# Patient Record
Sex: FEMALE | Race: BLACK OR AFRICAN AMERICAN | Employment: FULL TIME | URBAN - METROPOLITAN AREA
[De-identification: names, ages, dates, MRNs, and addresses within clinical notes are randomized per-mention and may not be internally consistent; named-entity substitution may affect disease eponyms.]

---

## 2018-03-13 ENCOUNTER — HOSPITAL ENCOUNTER (OUTPATIENT)
Age: 48
Setting detail: SPECIMEN
Discharge: HOME | End: 2018-03-13
Payer: COMMERCIAL

## 2018-03-13 ENCOUNTER — OFFICE VISIT (OUTPATIENT)
Dept: OBGYN CLINIC | Age: 48
End: 2018-03-13
Payer: COMMERCIAL

## 2018-03-13 VITALS
SYSTOLIC BLOOD PRESSURE: 122 MMHG | WEIGHT: 281 LBS | HEIGHT: 66 IN | BODY MASS INDEX: 45.16 KG/M2 | DIASTOLIC BLOOD PRESSURE: 82 MMHG

## 2018-03-13 DIAGNOSIS — D25.9 UTERINE LEIOMYOMA, UNSPECIFIED LOCATION: ICD-10-CM

## 2018-03-13 DIAGNOSIS — Z01.419 ENCOUNTER FOR GYNECOLOGICAL EXAMINATION WITHOUT ABNORMAL FINDING: Primary | ICD-10-CM

## 2018-03-13 DIAGNOSIS — N89.8 VAGINAL DISCHARGE: ICD-10-CM

## 2018-03-13 DIAGNOSIS — N89.8 VAGINAL ODOR: ICD-10-CM

## 2018-03-13 DIAGNOSIS — Z12.31 VISIT FOR SCREENING MAMMOGRAM: ICD-10-CM

## 2018-03-13 PROCEDURE — 99386 PREV VISIT NEW AGE 40-64: CPT | Performed by: OBSTETRICS & GYNECOLOGY

## 2018-03-13 RX ORDER — HYDROCODONE BITARTRATE AND ACETAMINOPHEN 5; 325 MG/1; MG/1
TABLET ORAL
Refills: 0 | COMMUNITY
Start: 2017-12-27 | End: 2021-08-20

## 2018-03-13 RX ORDER — CYCLOBENZAPRINE HCL 10 MG
TABLET ORAL
Refills: 0 | COMMUNITY
Start: 2017-12-27 | End: 2021-08-20

## 2018-03-13 NOTE — PROGRESS NOTES
DATE OF VISIT:  3/13/18        History and Physical    Yessenia Regan    :  1970  CHIEF COMPLAINT:    Chief Complaint   Patient presents with   Darlin Liu Doctor     New patient Here for annual  Last pap 12  last mammogram  nl  wants std testing has odor                     HPI :   Yessenia Regan is a 41 y.o. femaleGRAVIDA 5 PARA     Yessenia Regan returns today for her annual exam.  She is having regular bowel movements without constipation or diarrhea. She denies any involuntary loss of urine. She is otherwise without any significant complaints today.  _____________________________________________________________________  Past Medical History:   Diagnosis Date    Anemia     with first pregnancy    Diabetes mellitus ([address]     NIDDM    Hypertension                                                                    Past Surgical History:   Procedure Laterality Date    DENTAL SURGERY      dentures    DILATION AND CURETTAGE       Family History   Problem Relation Age of Onset    Hypertension Paternal Grandmother     Diabetes Paternal Grandmother     Coronary Art Dis Paternal Grandmother     Alzheimer's Disease Paternal Grandmother     Cancer Maternal Grandmother      LUNG    Cancer Maternal Grandfather     Hypertension Father     Diabetes Father     Cancer Father 68     pancreatic    Diabetes Sister     Hypertension Paternal Aunt     Coronary Art Dis Paternal Aunt      History   Smoking Status    Former Smoker   Smokeless Tobacco    Never Used     History   Alcohol Use No     Comment: SOCIALLY     Current Outpatient Prescriptions   Medication Sig Dispense Refill    cyclobenzaprine (FLEXERIL) 10 MG tablet take 1 tablet by mouth three times a day if needed  0    HYDROcodone-acetaminophen (NORCO) 5-325 MG per tablet take 1 tablet by mouth every 6 hours if needed  0    metoprolol (TOPROL XL) 50 MG XL tablet Take 50 mg by mouth 2 times daily.       MetFORMIN HCl neck was supple. There is no tracheal deviation, thyromegaly or supraclavicular adenopathy appreciated. Breast:   The patients breasts were symmetrical.  Breasts are nontender and there  were no masses, discharge or pathologic skin changes. There is no supraclavicular or axillary adenopathy bilaterally. Respiratory: There was unlabored respiratory effort. Lungs clear to ascultation without wheezes, rales or rhonchi in all fields bilaterally. Cardiovascular:  Normal sinus rhythm with a regular rate and without murmur, rubs or gallops. Abdomen: The abdomen was soft and non-tender with no guarding, rebound, CVAT or rigidity. No hernias were appreciated. Bowel sounds were normally active. Pelvic exam:  No vulvar, vaginal or cervical lesions are noted. There is grayish discharge noted. Slightly increased grayish vaginal discharge present, no significant cystocele, rectocele or enterocele noted. Uterus slightly enlarged and irregular in contour but limited due to body habitus. Uterus is without CMT and adnexa nontender and without abnormal masses bilaterally. Extremities:  FROM and nontender without clubbing cyanosis or edema. ASSESSMENT:         ICD-10-CM ICD-9-CM    1. Encounter for gynecological examination without abnormal finding Z01.419 V72.31 PAP SMEAR   2. Uterine leiomyoma, unspecified location D25.9 218.9    3. Vaginal odor N89.8 625.8 C. Trachomatis / N. Gonorrhoeae, DNA      VAGINITIS DNA PROBE   4. Vaginal discharge N89.8 623.5 C. Trachomatis / N. Gonorrhoeae, DNA      VAGINITIS DNA PROBE   5. Visit for screening mammogram Z12.31 V76.12 ZHANG DIGITAL SCREEN W CAD BILATERAL                   PLAN:  Cultures were obtained and she will be contacted with results and recommendation. Return to the office in 1 year or when necessary  Patient was seen with total face to face time of 20 minutes. Lynette Tavarez M.D., Mph  [facility]  [place]

## 2018-03-14 LAB
C TRACH DNA GENITAL QL NAA+PROBE: NEGATIVE
DIRECT EXAM: ABNORMAL
Lab: ABNORMAL
N. GONORRHOEAE DNA: NEGATIVE
SPECIMEN DESCRIPTION: ABNORMAL
STATUS: ABNORMAL

## 2018-03-15 RX ORDER — METRONIDAZOLE 500 MG/1
500 TABLET ORAL 2 TIMES DAILY
Qty: 14 TABLET | Refills: 0 | Status: SHIPPED | OUTPATIENT
Start: 2018-03-15 | End: 2018-03-22

## 2018-03-22 LAB — CYTOLOGY REPORT: NORMAL

## 2018-03-26 RX ORDER — METRONIDAZOLE 500 MG/1
500 TABLET ORAL ONCE
Qty: 4 TABLET | Refills: 0 | Status: SHIPPED | OUTPATIENT
Start: 2018-03-26 | End: 2018-03-26

## 2018-07-26 ENCOUNTER — HOSPITAL ENCOUNTER (OUTPATIENT)
Age: 48
Setting detail: SPECIMEN
Discharge: HOME | End: 2018-07-26
Payer: COMMERCIAL

## 2018-07-26 ENCOUNTER — OFFICE VISIT (OUTPATIENT)
Dept: OBGYN CLINIC | Age: 48
End: 2018-07-26
Payer: COMMERCIAL

## 2018-07-26 VITALS
DIASTOLIC BLOOD PRESSURE: 72 MMHG | WEIGHT: 293 LBS | HEIGHT: 66 IN | SYSTOLIC BLOOD PRESSURE: 118 MMHG | BODY MASS INDEX: 47.09 KG/M2

## 2018-07-26 DIAGNOSIS — N89.8 VAGINAL DISCHARGE: Primary | ICD-10-CM

## 2018-07-26 DIAGNOSIS — N89.8 VAGINAL DISCHARGE: ICD-10-CM

## 2018-07-26 PROCEDURE — 99213 OFFICE O/P EST LOW 20 MIN: CPT | Performed by: OBSTETRICS & GYNECOLOGY

## 2018-07-26 RX ORDER — METRONIDAZOLE 500 MG/1
500 TABLET ORAL 2 TIMES DAILY
Qty: 14 TABLET | Refills: 0 | Status: SHIPPED | OUTPATIENT
Start: 2018-07-26 | End: 2018-08-02

## 2018-07-26 NOTE — PROGRESS NOTES
Elian Rivas presents today with a history of abnormal vaginal discharge. The discharge has been present for 2 weeks and is associated with foul  odor,but no irritation or itching. She denies any fever, chills, nausea/vomiting, significant abdominal/pelvic discomfort, abnormal bleeding or UTI symptoms. Physical exam:    Vitals:    07/26/18 0909   BP: 118/72   Site: Right Arm   Position: Sitting   Weight: 294 lb (133.4 kg)   Height: 5' 6\" (1.676 m)       The abdomen is soft and nontender to deep palpation without organomegaly, masses or CVAT. Bowel sounds are normally active. External genitalia without lesions or inflammation noted. The vagina withMalodorous grayish white appearing discharge and no vaginal or cervical lesions or inflammation noted. Uterus nongravid and without CMT. Adnexa nontender and without abnormal masses bilaterally. Assessment/Plan:    ICD-10-CM ICD-9-CM    1. Vaginal discharge N89.8 623.5 VAGINITIS DNA PROBE      C. Trachomatis / N. Gonorrhoeae, DNA     Uriprobe and aerobic cultures were done. Urine culture was not sent. She will be contacted with results and recommendations. She was treated for presumptive BV with Flagyl . She will return to the office for her next scheduled appointment or prn. Patient was seen with total face to face time of 15 minutes.

## 2018-10-25 ENCOUNTER — HOSPITAL ENCOUNTER (OUTPATIENT)
Age: 48
Setting detail: SPECIMEN
Discharge: HOME | End: 2018-10-25
Payer: COMMERCIAL

## 2018-10-25 ENCOUNTER — OFFICE VISIT (OUTPATIENT)
Dept: OBGYN CLINIC | Age: 48
End: 2018-10-25
Payer: COMMERCIAL

## 2018-10-25 VITALS
DIASTOLIC BLOOD PRESSURE: 82 MMHG | WEIGHT: 293 LBS | SYSTOLIC BLOOD PRESSURE: 132 MMHG | BODY MASS INDEX: 47.09 KG/M2 | HEIGHT: 66 IN

## 2018-10-25 DIAGNOSIS — N89.8 VAGINAL DISCHARGE: ICD-10-CM

## 2018-10-25 DIAGNOSIS — N89.8 VAGINAL DISCHARGE: Primary | ICD-10-CM

## 2018-10-25 LAB
DIRECT EXAM: ABNORMAL
Lab: ABNORMAL
SPECIMEN DESCRIPTION: ABNORMAL
STATUS: ABNORMAL

## 2018-10-25 PROCEDURE — 99213 OFFICE O/P EST LOW 20 MIN: CPT | Performed by: OBSTETRICS & GYNECOLOGY

## 2018-10-25 NOTE — PROGRESS NOTES
Yamel Billingsley presents today with a history of abnormal vaginal discharge. The discharge has been present for 2 weeks and is  associated with foul  odor, but without any irritation or itching. She denies any fever, chills, nausea/vomiting, significant abdominal/pelvic discomfort, abnormal bleeding or UTI symptoms. Physical exam:    Vitals:    10/25/18 1050   BP: 132/82   Site: Right Upper Arm   Position: Sitting   Cuff Size: Medium Adult   Weight: 293 lb (132.9 kg)   Height: 5' 6\" (1.676 m)       The abdomen is soft and nontender to deep palpation without organomegaly, masses or CVAT. Bowel sounds are normally active. External genitalia without lesions or inflammation noted. The vagina with Scant amount of normal appearing discharge and no vaginal or cervical lesions or inflammation noted. Uterus nongravid and without CMT. Adnexa nontender and without abnormal masses bilaterally. Assessment/Plan:    ICD-10-CM    1. Vaginal discharge N89.8 C.trachomatis N.gonorrhoeae DNA     VAGINITIS DNA PROBE     Uriprobe and aerobic cultures were done. Urine culture was Not sent. She will be contacted with results and recommendations. She will return to the office for her next scheduled appointment or prn. Patient was seen with total face to face time of 15 minutes.

## 2018-10-26 LAB
C TRACH DNA GENITAL QL NAA+PROBE: NEGATIVE
N. GONORRHOEAE DNA: NEGATIVE

## 2018-10-29 RX ORDER — METRONIDAZOLE 500 MG/1
500 TABLET ORAL 2 TIMES DAILY
Qty: 14 TABLET | Refills: 0 | Status: SHIPPED | OUTPATIENT
Start: 2018-10-29 | End: 2019-02-14 | Stop reason: SDUPTHER

## 2019-02-14 RX ORDER — METRONIDAZOLE 500 MG/1
500 TABLET ORAL 2 TIMES DAILY
Qty: 14 TABLET | Refills: 0 | Status: SHIPPED | OUTPATIENT
Start: 2019-02-14 | End: 2019-02-21

## 2019-07-16 ENCOUNTER — TELEPHONE (OUTPATIENT)
Dept: OBGYN CLINIC | Age: 49
End: 2019-07-16

## 2019-07-16 DIAGNOSIS — N76.0 BV (BACTERIAL VAGINOSIS): Primary | ICD-10-CM

## 2019-07-16 DIAGNOSIS — B96.89 BV (BACTERIAL VAGINOSIS): Primary | ICD-10-CM

## 2019-07-16 RX ORDER — METRONIDAZOLE 500 MG/1
500 TABLET ORAL 2 TIMES DAILY
Qty: 14 TABLET | Refills: 0 | Status: SHIPPED | OUTPATIENT
Start: 2019-07-16 | End: 2019-07-23

## 2019-11-11 ENCOUNTER — TELEPHONE (OUTPATIENT)
Dept: OBGYN CLINIC | Age: 49
End: 2019-11-11

## 2019-11-12 DIAGNOSIS — N76.0 BV (BACTERIAL VAGINOSIS): Primary | ICD-10-CM

## 2019-11-12 DIAGNOSIS — B96.89 BV (BACTERIAL VAGINOSIS): Primary | ICD-10-CM

## 2019-11-12 RX ORDER — METRONIDAZOLE 500 MG/1
500 TABLET ORAL 2 TIMES DAILY
Qty: 14 TABLET | Refills: 0 | Status: SHIPPED | OUTPATIENT
Start: 2019-11-12 | End: 2019-11-19

## 2020-03-24 ENCOUNTER — OFFICE VISIT (OUTPATIENT)
Dept: OBGYN CLINIC | Age: 50
End: 2020-03-24
Payer: COMMERCIAL

## 2020-03-24 ENCOUNTER — HOSPITAL ENCOUNTER (OUTPATIENT)
Age: 50
Setting detail: SPECIMEN
Discharge: HOME | End: 2020-03-24
Payer: COMMERCIAL

## 2020-03-24 VITALS
BODY MASS INDEX: 46.28 KG/M2 | DIASTOLIC BLOOD PRESSURE: 101 MMHG | HEIGHT: 66 IN | WEIGHT: 288 LBS | SYSTOLIC BLOOD PRESSURE: 152 MMHG

## 2020-03-24 LAB
DIRECT EXAM: ABNORMAL
Lab: ABNORMAL
SPECIMEN DESCRIPTION: ABNORMAL

## 2020-03-24 PROCEDURE — 99213 OFFICE O/P EST LOW 20 MIN: CPT | Performed by: OBSTETRICS & GYNECOLOGY

## 2020-03-24 NOTE — PROGRESS NOTES
Marshall Bowens presents today with a possible STD exposure. Her significant other is promiscuous, was informed by a previous contact that she tested positive for CT. He was tested but she is unsure of his results? She denies any abnormal vaginal discharge. , foul  odor, irritation or itching. She denies any fever, chills, nausea/vomiting, significant abdominal/pelvic discomfort, abnormal bleeding or UTI symptoms. Physical exam:    Vitals:    03/24/20 0916   BP: (!) 152/101   Weight: 288 lb (130.6 kg)   Height: 5' 6\" (1.676 m)       The abdomen is soft and nontender to deep palpation without organomegaly, masses or CVAT. Bowel sounds are normally active. External genitalia without lesions or inflammation noted. The vagina with appearing discharge and no vaginal or cervical lesions or inflammation noted. Uterus nongravid and without CMT. Adnexa nontender and without abnormal masses bilaterally. Assessment/Plan:    ICD-10-CM    1. Possible exposure to STD Z20.2 C.trachomatis N.gonorrhoeae DNA     VAGINITIS DNA PROBE     Vaginitis DNA probe was done. Urine culture was not sent. She will be contacted with results and recommendations. She states that she is taking her antihypertensive and was informed of her BP today and to follow-up with her PCP. She will return to the office for her next scheduled appointment or prn. Patient was seen with total face to face time of 15 minutes.

## 2020-03-24 NOTE — PATIENT INSTRUCTIONS
your prescription from the pharmacy and take as directed. If any change in treatment or follow-up is required we will contact you with your results. Return to the office for your next scheduled visit or as needed.

## 2020-03-25 LAB
C TRACH DNA GENITAL QL NAA+PROBE: NEGATIVE
N. GONORRHOEAE DNA: NEGATIVE
SPECIMEN DESCRIPTION: NORMAL

## 2020-03-31 RX ORDER — METRONIDAZOLE 500 MG/1
500 TABLET ORAL 2 TIMES DAILY
Qty: 14 TABLET | Refills: 0 | Status: SHIPPED | OUTPATIENT
Start: 2020-03-31 | End: 2020-04-07

## 2020-10-27 ENCOUNTER — TELEPHONE (OUTPATIENT)
Dept: OBGYN CLINIC | Age: 50
End: 2020-10-27

## 2020-10-27 RX ORDER — METRONIDAZOLE 500 MG/1
500 TABLET ORAL 2 TIMES DAILY
Qty: 14 TABLET | Refills: 0 | Status: SHIPPED | OUTPATIENT
Start: 2020-10-27 | End: 2020-11-03

## 2020-10-27 NOTE — TELEPHONE ENCOUNTER
Patient states symptoms began two days ago and she believes she has a bacterial infection. Patient states she gets them frequently. Patient states she has discharge and odor. Patient denies frequency, burning, itching,and urgency. Patient would like to know if medication  can be called in to her pharmacy. Please advise. Pharmacy in system.

## 2021-06-21 ENCOUNTER — OFFICE VISIT (OUTPATIENT)
Dept: ORTHOPEDIC SURGERY | Age: 51
End: 2021-06-21

## 2021-06-21 VITALS — WEIGHT: 293 LBS | BODY MASS INDEX: 47.09 KG/M2 | HEIGHT: 66 IN

## 2021-06-21 DIAGNOSIS — M25.561 PAIN IN BOTH KNEES, UNSPECIFIED CHRONICITY: Primary | ICD-10-CM

## 2021-06-21 DIAGNOSIS — M25.562 PAIN IN BOTH KNEES, UNSPECIFIED CHRONICITY: Primary | ICD-10-CM

## 2021-06-21 DIAGNOSIS — M17.0 ARTHRITIS OF BOTH KNEES: ICD-10-CM

## 2021-06-21 PROCEDURE — 99244 OFF/OP CNSLTJ NEW/EST MOD 40: CPT | Performed by: ORTHOPAEDIC SURGERY

## 2021-06-21 RX ORDER — MELOXICAM 15 MG/1
15 TABLET ORAL DAILY
Qty: 30 TABLET | Refills: 3 | Status: SHIPPED | OUTPATIENT
Start: 2021-06-21 | End: 2021-10-21

## 2021-06-21 ASSESSMENT — ENCOUNTER SYMPTOMS
SHORTNESS OF BREATH: 0
CHEST TIGHTNESS: 0
COUGH: 0
EYE PAIN: 0
VOMITING: 0
ABDOMINAL PAIN: 0
APNEA: 0

## 2021-06-21 NOTE — PROGRESS NOTES
[facility]  [facility]  [address] [place] [address]  Dept: [phone]    Ambulatory Orthopedic Consult      CHIEF COMPLAINT:    Chief Complaint   Patient presents with    New Patient     L knee pain. No noted injury       HISTORY OF PRESENT ILLNESS:      The patient is a 54 y.o. female who is being seen at the request of  Dr. Basilio Mathew for consultation and evaluation of  Bilateral knee pain. [facility]  presents for bilateral knee pain that has been present for  5 years. The patient does not recall a specific injury. The pain improves with  Resting and activity modification. The pain worsens with  stair climbing and arising from a seated position. Instability is  noted. The patient has had a previous corticosteroid injections and Gel injections without relief. The patient has not had previous physical therapy for this problem. The patient has not tried oral NSAIDs for this problem previously. Patient reports that she takes Metformin for diabetes. She is a Non-smoker. Her current BMI 48.58. Past Medical History:    Past Medical History:   Diagnosis Date    Anemia     with first pregnancy    Diabetes mellitus ([facility] [address] 2007    NIDDM    Hypertension        Past Surgical History:    Past Surgical History:   Procedure Laterality Date    DENTAL SURGERY  2011    dentures    DILATION AND CURETTAGE         Current Medications:   Current Outpatient Medications   Medication Sig Dispense Refill    meloxicam (MOBIC) 15 MG tablet Take 1 tablet by mouth daily 30 tablet 3    cyclobenzaprine (FLEXERIL) 10 MG tablet take 1 tablet by mouth three times a day if needed  0    metoprolol (TOPROL XL) 50 MG XL tablet Take 50 mg by mouth 2 times daily.       MetFORMIN HCl (GLUCOPHAGE PO) Take 500 mg by mouth 2 times daily       HYDROcodone-acetaminophen (NORCO) 5-325 MG per tablet take 1 tablet by mouth every 6 hours if needed (Patient not taking: Reported on 6/21/2021)  0     No current facility-administered medications for this visit. Allergies:    Patient has no known allergies. Social History:   Social History     Socioeconomic History    Marital status: Single     Spouse name: Not on file    Number of children: Not on file    Years of education: Not on file    Highest education level: Not on file   Occupational History    Not on file   Tobacco Use    Smoking status: Former Smoker    Smokeless tobacco: Never Used   Substance and Sexual Activity    Alcohol use: No     Comment: SOCIALLY    Drug use: No    Sexual activity: Yes     Partners: Male   Other Topics Concern    Not on file   Social History Narrative    Not on file     Social Determinants of Health     Financial Resource Strain:     Difficulty of Paying Living Expenses:    Food Insecurity:     Worried About Running Out of Food in the Last Year:     [address] in the Last Year:    Transportation Needs:     Lack of Transportation (Medical):      Lack of Transportation (Non-Medical):    Physical Activity:     Days of Exercise per Week:     Minutes of Exercise per Session:    Stress:     Feeling of Stress :    Social Connections:     Frequency of Communication with Friends and Family:     Frequency of Social Gatherings with Friends and Family:     Attends Je[employer] witness Services:     Active Member of Clubs or Organizations:     Attends Club or Organization Meetings:     Marital Status:    Intimate Partner Violence:     Fear of Current or Ex-Partner:     Emotionally Abused:     Physically Abused:     Sexually Abused:        Family History:  Family History   Problem Relation Age of Onset    Hypertension Paternal Grandmother     Diabetes Paternal Grandmother     Coronary Art Dis Paternal Grandmother     Alzheimer's Disease Paternal Grandmother     Cancer Maternal Grandmother         LUNG    Cancer Maternal Grandfather     Hypertension Father     Diabetes Father     Cancer varus Yunier's testing. No calf tenderness is noted. There is a negative hip log roll and Stinchfield test.  Motor, sensory, vascular examination to the Left lower extremity is intact. Patient has full range of motion of the ankle. Evaluation of the Right knee reveals no significant outward deformity. There is no erythema, warmth, skin lesions, signs of infection. There is tenderness over the medial joint line. There is a mildknee effusion. Range of motion of the Right knee is  5-95. No instability of the knee is appreciated at 0 and 30° of flexion. There is a negative anterior drawer Lachman's test.  There is increased pain with varus Yunier's testing. No calf tenderness is noted. There is a negative hip log roll and Stinchfield test.  Motor, sensory, vascular examination to the Right lower extremity is intact. Patient has full range of motion of the ankle. Radiology:     XR KNEE LEFT (MIN 4 VIEWS)    Result Date: 6/23/2021  History:   Left knee pain Findings:   Standing AP/Lateral/Tunnel/Merchant view xrays of the Left done in the office today shows severe  medial joint space narrowing, tricompartmental osteophytosis, joint line sclerosis medially. No evidence of fracture, subluxation, dislocation, radioopaque foreign body/tumor is noted. Lateral subluxation of the tibia is appreciated. Impression:   Left knee severe  degenerative changes as described above. XR KNEE RIGHT (MIN 4 VIEWS)    Result Date: 6/23/2021  History:   Right knee pain Findings:   Standing AP/Lateral/Tunnel/Merchant view xrays of the Right done in the office today shows severe  medial joint space narrowing, tricompartmental osteophytosis, joint line sclerosis medially. No evidence of fracture, subluxation, dislocation, radioopaque foreign body/tumor is noted. Lateral subluxation of the tibia is appreciated. Impression:   Right knee severe  degenerative changes as described above. ASSESSMENT:     1. Pain in both knees, unspecified chronicity    2. BMI 45.0-49.9, adult ([address]    3. Arthritis of both knees         PLAN:       Reviewed x-rays with the patient today in the office. Discussed etiology and natural history of bilateral knee arthritis. The treatment options may include oral anti-inflammatories, bracing, injections, advanced imaging, activity modification, physical therapy and/or surgical intervention. Had a lengthy discussion with the patient today in the office about a total knee arthroplasty. Had a disscussion with the patient about her BMI and how it correlates with infection. The patient would also need to work on A1c. The patient would like to proceed with mobic, formal therapy, and weightloss center referral.   The patient will follow up in the office as needed. We discussed that the patient should call us with any concerns or questions. Return if symptoms worsen or fail to improve.     Orders Placed This Encounter   Medications    meloxicam (MOBIC) 15 MG tablet     Sig: Take 1 tablet by mouth daily     Dispense:  30 tablet     Refill:  3       Orders Placed This Encounter   Procedures    XR KNEE LEFT (MIN 4 VIEWS)     Standing Status:   Future     Number of Occurrences:   1     Standing Expiration Date:   6/21/2022    XR KNEE RIGHT (MIN 4 VIEWS)     Standing Status:   Future     Number of Occurrences:   1     Standing Expiration Date:   6/21/2022    Ambulatory referral to [facility]     Referral Priority:   Routine     Referral Type:   Consult for Advice and Opinion     Referral Reason:   Specialty Services Required     Requested Specialty:   Bariatrics     Number of Visits Requested:   [facility]     Referral Priority:   Routine     Referral Type:   Eval and Treat     Referral Reason:   Specialty Services Required     Requested Specialty:   Physical Therapy     Number of Visits Requested:   1     Carleen SOLORIO LPN am scribing for and in the presence of Dr. Nancy Guardado  6/24/2021 10:14 PM    I have reviewed and made changes accordingly to the work scribed by Kathryn Grace LPN. The documentation accurately reflects work and decisions made by me. I have also reviewed documentation completed by clinical staff.     Nancy Guardado DO, [facility]  6/24/2021 10:15 PM    This note is created with the assistance of a speech recognition program.  While intending to generate a document that actually reflects the content of the visit, the document can still have some errors including those of syntax and sound a like substitutions which may escape proof reading.  In such instances, actual meaning can be extrapolated by contextual diversion      Electronically signed by Susan Garber on 6/24/2021 at 10:14 PM

## 2021-06-21 NOTE — LETTER
Dr. Dillon Lugo  [address] [address]  Dept: [phone]  Dept Fax: [phone]        6/24/21    Patient: Migule Og  YOB: 1970    Dear Eloise Marie,    I had the pleasure of seeing one of your patients, Chepe Brooks today in the office. Below are the relevant portions of my assessment and plan of care. IMPRESSION:  1. Pain in both knees, unspecified chronicity    2. BMI 45.0-49.9, adult ([facility] [address]    3. Arthritis of both knees      PLAN:  Reviewed x-rays with the patient today in the office. Discussed etiology and natural history of bilateral knee arthritis. The treatment options may include oral anti-inflammatories, bracing, injections, advanced imaging, activity modification, physical therapy and/or surgical intervention. Had a lengthy discussion with the patient today in the office about a total knee arthroplasty. Had a disscussion with the patient about her BMI and how it correlates with infection. The patient would also need to work on A1c. The patient would like to proceed with mobic, formal therapy, and weightloss center referral.   The patient will follow up in the office as needed. We discussed that the patient should call us with any concerns or questions. Thank you for allowing me to participate in the care of this patient. I will keep you updated on this patient's follow up and I look forward to serving you and your patients again in the future. Please don't hesitate to contact me at my mobile number [phone].         Parviz Clifton

## 2021-06-24 ENCOUNTER — HOSPITAL ENCOUNTER (OUTPATIENT)
Dept: PHYSICAL THERAPY | Age: 51
Setting detail: THERAPIES SERIES
Discharge: HOME | End: 2021-06-24
Payer: COMMERCIAL

## 2021-06-24 NOTE — FLOWSHEET NOTE
[x] [facility]) - [facility]  [address]    [phone]  F: [phone]   [] [address]   [address]  P: [phone]  F: [phone]  [] [address]  Therapy  [address]  P: [phone]  F: [phone] [] [address]  P: [phone]  F: [phone]  [] [address]  [facility]   [address]   [place]: [phone]  F: [phone]   [] [address]  [place]: [phone]   F: [phone]     Physical Therapy Cancel/No Show note    Date: 2021  Patient: Domonique Flower  : 1970  MRN: [id number]    Cancels/No Shows to date:     For today's appointment patient:    [x]  Cancelled    [x] Rescheduled appointment     [] No-show     Reason given by patient:    []  Patient ill    []  Conflicting appointment    [] No transportation      [] Conflict with work    [] No reason given    [] Weather related    [] COVID-19    [] Other:      Comments:  Patient went to wrong location for PT appointment.        [] Next appointment was confirmed    Electronically signed by: Alec Resendez PT

## 2021-06-30 ENCOUNTER — HOSPITAL ENCOUNTER (OUTPATIENT)
Dept: PHYSICAL THERAPY | Age: 51
Setting detail: THERAPIES SERIES
Discharge: HOME | End: 2021-06-30
Payer: COMMERCIAL

## 2021-06-30 PROCEDURE — 97110 THERAPEUTIC EXERCISES: CPT

## 2021-06-30 PROCEDURE — 97162 PT EVAL MOD COMPLEX 30 MIN: CPT

## 2021-06-30 NOTE — CONSULTS
[x] [facility] &  Therapy  [address]  [phone]  F: [phone]         Physical Therapy Lower Extremity Evaluation    Date:  2021  Patient: Rocio Shaw  : 1970  MRN: [id number]  Physician: Ana Garcias    Insurance: [employer] (limit 60 Rx)  Medical Diagnosis: Arthritis B knees M17.0     Rehab Codes: M25.561, M25.562, M25.661, M25.662, R26.2, R26.89, M62.551, M62.552  Onset date: 2021  Next 's appt.: August    Subjective:   HPI/CC: Pt reports pain B knee pain beginning last year, at first R knee, then L knee began. Pt reports constant aching, throbbing, pain. Knees hurt so bad she can barely walk, has to stand for a minute before she can walk, bending \"is torture,\" ie getting in car and on toilet, reports she falls into them as can't control. Going up stairs \"is a nightmare,\" is scared knees will buckle on her. Dr Yuni Hauser did most recent steroid injection L knee, approx 1 month ago w/relief. R knee had cartilage injections last year with relief for approx 6 months, insurance denied injections for L knee. Pain decreases w/heat and ice, able to walk better after ice. Pain increases w/walking legs hanging off of couch or bed, sitting for too long, stand-sit, sit-stand, and steps, up to 9/10. No numbness, tingling. Pt reports she should have surgery, but is unable to due to her weight and diabetes, is to see weight management (appt ).        PMHx: [] Unremarkable [x] Diabetes [x] HTN  [] Pacemaker   [] MI/Heart Problems [] Cancer [] Arthritis   [x] Other: anemia, dental surgery, D&C              [x] Refer to full medical chart  In EPIC       Comorbidities:   [x] Obesity [] Dialysis  [] N/A   [] Asthma/COPD [] Dementia [] Other:   [] Stroke [] Sleep apnea [] Other:   [] Vascular disease [] Rheumatic disease [] Other:     Tests: [x] X-Ray: B knees severe degenerative changes [] MRI:  [] Other:      Medications: [x] Refer to full medical record [] None [] Other:  Allergies:      [x] Refer to full medical record [x] None [] Other:    Function:  Hand Dominance  [x] Right  [] Left  Patient lives with: 6 yr old   In what type of home []  One story   [x] Two story [facility]-bed/bath 2nd floor, rail first 5 steps, holds onto rail for rest   Number of stairs to enter 1-no rail   Bathroom has a []  Tub only  [x] Tub/shower combo   [] Walk in shower    []  Grab bars   Washing machine is on []  Main level   [] Second level   [x]2nd unit, 1 small step into   Employer [employer], -working remote   Job Status [x]  Normal duty   [] Light duty   [] Off due to condition    []  Retired   [] Not employed   [] Disability  [] Other:  []  Return to work:    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, 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; 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       ADL/IADL Previous level of function Current level of function Who currently assists the patient with task   Bathing  [x] Independent  [] Assist [x] Independent  [] Assist    Dress/grooming [x] Independent  [] Assist [] Independent  [x] Assist Difficulty w/socks and pants   Transfer/mobility [x] Independent  [] Assist [x] Independent  [] Assist    Feeding [x] Independent  [] Assist [x] Independent  [] Assist    Toileting [x] Independent  [] Assist [x] Independent  [] Assist On/off toilet difficulty\" falls down on\", uses sink to help get up   Driving [x] Independent  [] Assist [x] Independent  [] Assist constant ache when driving, painful in/out of car   Housekeeping [x] Independent  [] Assist [x] Independent  [] Assist    Grocery shop/meal prep [x] Independent  [] Assist [x] Independent  [] Assist      Gait Prior level of function Current level of function    [x] Independent  [] Assist [x] Independent  [] Assist   Device: [x] Independent [x] Independent    [] Straight Cane [] Quad cane [] Straight Cane [] Quad cane    [] Standard walker [] Rolling walker   [] 4 wheeled walker [] Standard walker [] Rolling walker   [] 4 wheeled walker    [] Wheelchair [] Wheelchair     Pain:  [x] Yes  [] No Location: B knees  Pain Rating: (0-10 scale) 7/10 R knee; L knee 3-4/10  Pain altered Tx:  [] Yes  [x] No  Action:    Symptoms:  [] Improving [x] Worsening [] Same    Sleep: [x] OK    [x] Disturbed-difficulty falling asleep, does not wake her up once asleep    Objective:    ROM  ° A/P STRENGTH TESTS (+/-) Left Right Not Tested    Left Right Left Right Ant. Drawer   []   Hip Flex   3+p 3+p Post. Drawer   []   Ext   3+p 3+p Lachmans   []   ER     Valgus Stress   []   IR     Varus Stress   []   ABD     Yuniers   []   ADD     Apleys Comp.   []   Knee Flex 111°p 79°p 3+p 4-p Apleys Dist.   []   Ext 8°p 17°p 4-p 4-p Hip Scouring   []   Ankle DF   4p 3+p KENDRAs   []   PF     Piriformis   []   INV     Kvngs   []   EVER     Talor Tilt   []        Pat-Fem Grind   []   p=pain      OBSERVATION No Deficit Deficit Not Tested Comments   Posture       Forward Head [] [] []    Rounded Shoulders [] [] []    Kyphosis [] [] []    Lordosis [] [] []    Lateral Shift [] [] []    Scoliosis [] [] []    Iliac Crest [] [] []    PSIS [] [] []    ASIS [] [] []    Genu Valgus [] [] []    Genu Varus [] [] []    Genu Recurvatum [] [] []    Pronation [] [] []    Supination [] [] []    Leg Length Discrp [] [] []    Slumped Sitting [] [] []    Palpation [] [x] [] Ant R knee, R med, lat Jt line; L lateral jt line   Sensation [] [] []    Edema [] [] []    Neurological [] [] []    Patellar Mobility [] [x] [] Tender L   Patellar Orientation [] [x] [] R glides lat, L glides medial w/quad contraction   Gait [] [x] [] Analysis:           Functional Test: LEFS Score: 81% functionally impaired     Comments:    Assessment:  Patient would benefit from skilled physical therapy services in order to: decrease knee pain, increase knee ROM, increase LE strength, and improve walking, steps, sit-stands, in/out of car, and on/off toilet. Problems:    [x] ? Pain: R knee 7/10, L knee 3-4/10, B knees up to 9/10  [x] ? ROM: B knees   [x] ? Strength: B LE  [x] ? Function: LEFS 81% loss of function   [] Other:       STG: (to be met in 10 treatments)  1. ? Pain: R knee average 6/10, 8/10 at worst; L knee average 4/10, 7/10 at worst  2. ? ROM: R knee 10-96°, L knee 5-116°  3. ? Strength: B knees 4/5, B hips 4-/5, R DF 4-/5  4. ? Function: LEFS 66% loss of function   5. Pt demonstrate improved gait pattern  6. Patient to be independent with home exercise program as demonstrated by performance with correct form without cues. LTG: (to be met in 20 treatments)  1. ? Pain: R knee average 4/10, 6/10 at worst; L knee average 2/10, 5/10 at worst  2. ? ROM: R knee 6-106°  3. ? Strength: B knees 4+/5, B hips 4/5, R DF 4/5  4. ? Function: LEFS 40% loss of function   5. Pt reports improved sit-stands, in/out of car, and steps                   Patient goals: \"To be able to move freely with little or no pain, bend my knees and relief of pain. \"    Rehab Potential:  [x] Good  [] Fair  [] Poor   Suggested Professional Referral:  [x] No  [] Yes:  Barriers to Goal Achievement:  [x] No  [] Yes:  Domestic Concerns:  [x] No  [] Yes:    Pt. Education:  [x] Plans/Goals, Risks/Benefits discussed  [x] Home exercise program    Method of Education: [x] Verbal  [x] Demo  [x] Written  Comprehension of Education:  [x] Verbalizes understanding. [] Demonstrates understanding. [] Needs Review. [] Demonstrates/verbalizes understanding of HEP/Ed previously given. HEP: Access Code: [email or web address]  URL: [email or web address]  Supine Quad Set - 3 x daily - 7 x weekly - 10 reps - 5 seconds hold  Supine Hip Adduction Isometric with Ball - 3 x daily - 7 x weekly - 10 reps - 5 sec hold  Supine Short Arc Quad - 3 x daily - 7 x weekly - 20 reps  Supine Active Straight Leg Raise - 3 x daily - 7 x weekly - 20 reps        Treatment Plan:  [x] Therapeutic Exercise   51994  [] Iontophoresis: 4 mg/mL Dexamethasone Sodium Phosphate  mAmin  12622   [] Therapeutic Activity  10164 [x] Vasopneumatic cold with compression  71569    [x] Gait Training   27071 [x] Ultrasound   09237   [] Neuromuscular Re-education  77760 [] Electrical Stimulation Unattended  75197   [x] Manual Therapy  31413 [] Electrical Stimulation Attended  46224   [x] Instruction in HEP  [] Lumbar/Cervical Traction  16021   [] Aquatic Therapy   93383 [x] Cold/hotpack    [] Massage   98474      [] Dry Needling, 1 or 2 muscles  41165   [] Biofeedback, first 15 minutes   67694  [] Biofeedback, additional 15 minutes   30942 [] Dry Needling, 3 or more muscles  47931     []  Medication allergies reviewed for use of    Dexamethasone Sodium Phosphate 4mg/ml     with iontophoresis treatments. Pt is not allergic.     Frequency:  2 x/week for 20 visits        Todays Treatment:  Modalities:   Precautions:  Exercises:  Exercise Reps/ Time Weight/ Level Comments         Quad sets  10x  5sec     Add sets  10x  5sec     SAQ 10x     SLR 10x           Other:    Specific Instructions for next treatment:begin SciFit, remaining mat ex, progress knee ex per Pt tolerance; begin game ready to decrease pain, inflammation      Evaluation Complexity:  History (Personal factors, comorbidities) [] 0 [] 1-2 [x] 3+   Exam (limitations, restrictions) [] 1-2 [] 3 [x] 4+   Clinical presentation (progression) [] Stable [x] Evolving  [] Unstable   Decision Making [] Low [x] Moderate [] High    [] Low Complexity [x] Moderate Complexity [] High Complexity       Treatment Charges: Mins Units   [x] Evaluation       []  Low       [x]  Moderate       []  High 39 1   []  Modalities     [x]  Ther Exercise 12 1   []  Manual Therapy     []  Ther Activities     []  Aquatics     []  Vasocompression     []  Other       TOTAL TREATMENT TIME: 51 min    Time in:0703   Time OTJ:1354    Electronically signed by: Azalea Bradford PT          Physician Signature:________________________________Date:__________________  By signing above or cosigning this note, I have reviewed this plan of care and certify a need for medically necessary rehabilitation services.      *PLEASE SIGN ABOVE AND FAX BACK ALL PAGES*

## 2021-07-06 ENCOUNTER — HOSPITAL ENCOUNTER (OUTPATIENT)
Dept: PHYSICAL THERAPY | Age: 51
Setting detail: THERAPIES SERIES
Discharge: HOME | End: 2021-07-06
Payer: COMMERCIAL

## 2021-07-06 PROCEDURE — 97016 VASOPNEUMATIC DEVICE THERAPY: CPT

## 2021-07-06 PROCEDURE — 97110 THERAPEUTIC EXERCISES: CPT

## 2021-07-06 NOTE — FLOWSHEET NOTE
[x] [facility] &  Therapy  [address]  [phone]  F: [phone]     Physical Therapy Daily Treatment Note    Date:  2021  Patient Name:  Denis Chávez      :  1970    MRN: [id number]  Physician: Darya Alston                   Insurance: [facility] [employer] (limit 60 Rx)  Medical Diagnosis: Arthritis B knees M17.0                                    Rehab Codes: M25.561, M25.562, M25.661, M25.662, R26.2, R26.89, M62.551, M62.552  Onset date: 2021                      Next 's appt.: August    Visit# / total visits:    Cancels/No Shows: 0/1    Subjective:    Pain:  [] Yes  [] No Location: B knees   Pain Rating: (0-10 scale) 6/10 R, 4/10 L   Pain altered Tx:  [] No  [] Yes  Action:  Comments: Knees are hurting a little this morning. Worsens with prolonged standing/walking. Objective:  Modalities: Game Ready to R knee at end of Tx, 38 degrees min compression   Precautions:  Exercises:  Exercise Reps/ Time Weight/ Level Comments   SciFit 6 min Level 4          Seated      Hamstring Stretch on Stool 3x20\" ea     LAQ 10x3\"           Supine      Quadriceps Isometric 10x5\" ea     Hip Adduction Isometric 15x5\"     Heel Slides 10x ea     SLR 10x ea     Bridge w/ball 15x     SAQ w/ball 10-15x     Hooklying Clamshell 2x10 Blueberry                                                                Other:      Treatment Charges: Mins Units   []  Modalities     [x]  Ther Exercise 30 2   []  Manual Therapy     []  Ther Activities     []  Aquatics     []  Vasocompression 15 1   []  Other     Total Treatment time 45 3       Assessment: [x] Progressing toward goals: initiated Tx this date to address B knee ROM, strength and overall improved function. R knee more painful throughout Tx, but notes it did start feeling a little better throughout Tx. Relief noted following vaso to R knee this date. [] No change.      [] Other:  [x] Patient would benefit from skilled signed by:  Jass Agarwal, OLEG

## 2021-07-09 ENCOUNTER — HOSPITAL ENCOUNTER (OUTPATIENT)
Dept: PHYSICAL THERAPY | Age: 51
Setting detail: THERAPIES SERIES
Discharge: HOME | End: 2021-07-09
Payer: COMMERCIAL

## 2021-07-09 PROCEDURE — 97016 VASOPNEUMATIC DEVICE THERAPY: CPT

## 2021-07-09 PROCEDURE — 97110 THERAPEUTIC EXERCISES: CPT

## 2021-07-09 NOTE — FLOWSHEET NOTE
[x] [facility]  Therapy  [address]  [phone]  F: [phone]     Physical Therapy Daily Treatment Note    Date:  2021  Patient Name:  Segundo Odom      :  1970    MRN: [id number]  Physician: Chucho Zhong                   Insurance: [employer] (limit 60 Rx)  Medical Diagnosis: Arthritis B knees M17.0                                    Rehab Codes: M25.561, M25.562, M25.661, M25.662, R26.2, R26.89, M62.551, M62.552  Onset date: 2021                      Next 's appt.: August    Visit# / total visits: 3/20   Cancels/No Shows: 0/1    Subjective:    Pain:  [x] Yes  [] No Location: B knees   Pain Rating: (0-10 scale) 7/10 R, 4/10 L   Pain altered Tx:  [] No  [] Yes  Action:  Comments: Pt reports doing the exercises helps, but walking anywhere really aggravates it. Game ready R knee last Rx felt good.       Objective:  Modalities: Game Ready to R knee at end of Tx, 38 degrees min compression   Precautions:  Exercises:  Exercise Reps/ Time Weight/ Level Comments   SciFit 5 min Level 2 Pt reports R knee bending is painful, instructed to keep comfortable ROM         Stand at bars    Added    Calf stretch  3x 15sec On slant board   HR  15x     HS curls 10x     Hip abd  10x                 Seated      Hamstring Stretch on Stool 3x20\" ea     LAQ 10x3\"           Supine      Quadriceps Isometric 10x5\" ea     Hip Adduction Isometric 15x5\"     Heel Slides 15x ea  Progressed reps    SLR 10x ea     Bridge w/ball 15x     SAQ w/ball 15x     Hooklying Clamshell 2x10 Blueberry                                                                Other:      Treatment Charges: Mins Units   []  Modalities     [x]  Ther Exercise 32 2   []  Manual Therapy     []  Ther Activities     []  Aquatics     [x]  Vasocompression 15 1   []  Other     Total Treatment time 47 3       Assessment: [x] Progressing toward goals: Initiated standing calf stretch, HR, HS curls and hip abd to improve standing activities. Cont game ready R knee at end of Rx to decrease pain and edema. [] No change. [] Other:  [x] Patient would benefit from skilled physical therapy services in order to: decrease knee pain, increase knee ROM, increase LE strength, and improve walking, steps, sit-stands, in/out of car, and on/off toilet. STG: (to be met in 10 treatments)  1. ? Pain: R knee average 6/10, 8/10 at worst; L knee average 4/10, 7/10 at worst  2. ? ROM: R knee 10-96°, L knee 5-116°  3. ? Strength: B knees 4/5, B hips 4-/5, R DF 4-/5  4. ? Function: LEFS 66% loss of function   5. Pt demonstrate improved gait pattern  6. Patient to be independent with home exercise program as demonstrated by performance with correct form without cues.     LTG: (to be met in 20 treatments)  1. ? Pain: R knee average 4/10, 6/10 at worst; L knee average 2/10, 5/10 at worst  2. ? ROM: R knee 6-106°  3. ? Strength: B knees 4+/5, B hips 4/5, R DF 4/5  4. ? Function: LEFS 40% loss of function   5. Pt reports improved sit-stands, in/out of car, and steps                    Patient goals: \"To be able to move freely with little or no pain, bend my knees and relief of pain. \"    Pt. Education:  [x] Yes  [] No  [x] Reviewed Prior HEP/Ed  Method of Education: [x] Verbal  [x] Demo  [] Written  Comprehension of Education:  [x] Verbalizes understanding. [] Demonstrates understanding. [x] Needs review. [] Demonstrates/verbalizes HEP/Ed previously given. 6/30/21: HEP: Access Code: [email or web address]  URL: [email or web address]  Supine Quad Set - 3 x daily - 7 x weekly - 10 reps - 5 seconds hold  Supine Hip Adduction Isometric with Ball - 3 x daily - 7 x weekly - 10 reps - 5 sec hold  Supine Short Arc Quad - 3 x daily - 7 x weekly - 20 reps  Supine Active Straight Leg Raise - 3 x daily - 7 x weekly - 20 reps      Plan: [x] Continue current frequency toward long and short term goals.    [x] Frequency:  2 x/week for 20 visits [x] Specific Instructions for subsequent treatments: add remaining standing ex soon, progress mat ex, progress knee ex per Pt tolerance          Time In: 0805            Time Out: 6381      Electronically signed by:  Ángela Jaime PT

## 2021-07-14 ENCOUNTER — HOSPITAL ENCOUNTER (OUTPATIENT)
Dept: PHYSICAL THERAPY | Age: 51
Setting detail: THERAPIES SERIES
Discharge: HOME | End: 2021-07-14
Payer: COMMERCIAL

## 2021-07-14 ENCOUNTER — TELEPHONE (OUTPATIENT)
Dept: BARIATRICS/WEIGHT MGMT | Age: 51
End: 2021-07-14

## 2021-07-14 PROCEDURE — 97016 VASOPNEUMATIC DEVICE THERAPY: CPT

## 2021-07-14 PROCEDURE — 97110 THERAPEUTIC EXERCISES: CPT

## 2021-07-14 NOTE — FLOWSHEET NOTE
4       Assessment: [x] Progressing toward goals: Progressed gluteal and quad exercises for strengthening as charted, did increase focus on medial quads. Added hip flexor stretching with good tolerance. Will progress ckc exercises due to good tolerance throughout session today. [] No change. [] Other:  [x] Patient would benefit from skilled physical therapy services in order to: decrease knee pain, increase knee ROM, increase LE strength, and improve walking, steps, sit-stands, in/out of car, and on/off toilet. STG: (to be met in 10 treatments)  1. ? Pain: R knee average 6/10, 8/10 at worst; L knee average 4/10, 7/10 at worst  2. ? ROM: R knee 10-96°, L knee 5-116°  3. ? Strength: B knees 4/5, B hips 4-/5, R DF 4-/5  4. ? Function: LEFS 66% loss of function   5. Pt demonstrate improved gait pattern  6. Patient to be independent with home exercise program as demonstrated by performance with correct form without cues.     LTG: (to be met in 20 treatments)  1. ? Pain: R knee average 4/10, 6/10 at worst; L knee average 2/10, 5/10 at worst  2. ? ROM: R knee 6-106°  3. ? Strength: B knees 4+/5, B hips 4/5, R DF 4/5  4. ? Function: LEFS 40% loss of function   5. Pt reports improved sit-stands, in/out of car, and steps                    Patient goals: \"To be able to move freely with little or no pain, bend my knees and relief of pain. \"    Pt. Education:  [x] Yes  [] No  [x] Reviewed Prior HEP/Ed  Method of Education: [x] Verbal  [x] Demo  [] Written  Comprehension of Education:  [x] Verbalizes understanding. [] Demonstrates understanding. [x] Needs review. [] Demonstrates/verbalizes HEP/Ed previously given. 6/30/21: HEP: Access Code: [email or web address]  URL: [email or web address]  Supine Quad Set - 3 x daily - 7 x weekly - 10 reps - 5 seconds hold  Supine Hip Adduction Isometric with Ball - 3 x daily - 7 x weekly - 10 reps - 5 sec hold  Supine Short Arc Quad - 3 x daily - 7 x weekly - 20 reps  Supine

## 2021-07-16 ENCOUNTER — HOSPITAL ENCOUNTER (OUTPATIENT)
Dept: PHYSICAL THERAPY | Age: 51
Setting detail: THERAPIES SERIES
Discharge: HOME | End: 2021-07-16
Payer: COMMERCIAL

## 2021-07-16 PROCEDURE — 97016 VASOPNEUMATIC DEVICE THERAPY: CPT

## 2021-07-16 PROCEDURE — 97110 THERAPEUTIC EXERCISES: CPT

## 2021-07-16 NOTE — FLOWSHEET NOTE
[x] [facility]  [address]  [phone]  F: [phone]     Physical Therapy Daily Treatment Note    Date:  2021  Patient Name:  Michelet Korean      :  1970    MRN: [id number]  Physician: Felipe Oleary                   Insurance: [employer] (limit 60 Rx)  Medical Diagnosis: Arthritis B knees M17.0                                    Rehab Codes: M25.561, M25.562, M25.661, M25.662, R26.2, R26.89, M62.551, M62.552  Onset date: 2021                      Next 's appt.: August  Visit# / total visits:    Cancels/No Shows: 0/1    Subjective:    Pain:  [x] Yes  [] No Location: B knees   Pain Rating: (0-10 scale) 4/10 R, 4/10 L   Pain altered Tx:  [] No  [] Yes  Action:  Comments:   Reports knee are slowly feeling better, R knee feels stiffer than left. Objective:  Modalities: Game Ready to R knee at end of Tx, 38° mod compression and CP on left knee with wedge post exercises. Precautions:  Exercises:  Exercise   Jason knees Reps/ Time Weight/ Level Comments   SciFit 5 min Level 2           Stand at bars        Calf stretch  3x 15sec On slant board   HR  20x 1.5# Added wt,incr. Reps    HS curls 15x 1.5 # '   Hip abd  15x 1.5# '   Hip ext 15x 1.5# \"   Hip flexion/SLR 15x 1.5# \"   TKE 15x5\" blue     Step up  10x 6\" Lateral,fwd, added , trial 4\" step ups next session. Seated      HS stretch on Stool 3x20\" ea  Omitted in error    LAQ 10x3\" 1.5# W/ ball, added wt            Supine      Quadriceps Isometric 10x5\" ea     Hip Adduction Isometric 15x5\"      Heel Slides 15x ea      SLR 10x ea A    SLR w/ ER 10x ea A     Bridge w/ball 15x     SAQ w/ball 15x 1.5 # -R  A-L   Manual assist for Left patellar tracking.     Hooklying Clamshell 2x10 Blueberry           Prone      Hip flexor stretch 3x20\"ea  Strap,                                               Other:      Treatment Charges: Mins Units   []  Modalities [x]  Ther Exercise 49  3   []  Manual Therapy     []  Ther Activities     []  Aquatics     [x]  Vasocompression 15 1   []  Other     Total Treatment time  64        Assessment: [x] Progressing toward goals: Progressed CKC exercises and added resistance for gluteal and quad strengthening. [] No change. [x] Other:  Will change step up to  a decreased height due to use UE greatly. Offered manual assistance for patellar traction on Left with improved comfort expressed  [x] Patient would benefit from skilled physical therapy services in order to: decrease knee pain, increase knee ROM, increase LE strength, and improve walking, steps, sit-stands, in/out of car, and on/off toilet. STG: (to be met in 10 treatments)  1. ? Pain: R knee average 6/10, 8/10 at worst; L knee average 4/10, 7/10 at worst  2. ? ROM: R knee 10-96°, L knee 5-116°  3. ? Strength: B knees 4/5, B hips 4-/5, R DF 4-/5  4. ? Function: LEFS 66% loss of function   5. Pt demonstrate improved gait pattern  6. Patient to be independent with home exercise program as demonstrated by performance with correct form without cues.     LTG: (to be met in 20 treatments)  1. ? Pain: R knee average 4/10, 6/10 at worst; L knee average 2/10, 5/10 at worst  2. ? ROM: R knee 6-106°  3. ? Strength: B knees 4+/5, B hips 4/5, R DF 4/5  4. ? Function: LEFS 40% loss of function   5. Pt reports improved sit-stands, in/out of car, and steps                    Patient goals: \"To be able to move freely with little or no pain, bend my knees and relief of pain. \"    Pt. Education:  [x] Yes  [] No  [x] Reviewed Prior HEP/Ed  Method of Education: [x] Verbal  [x] Demo  [] Written  Comprehension of Education:  [x] Verbalizes understanding. [] Demonstrates understanding. [x] Needs review. [] Demonstrates/verbalizes HEP/Ed previously given. 6/30/21: HEP: Access Code: [email or web address]  URL: [email or web address]  Supine Quad Set - 3 x daily - 7 x weekly - 10 reps - 5 seconds hold  Supine Hip Adduction Isometric with Ball - 3 x daily - 7 x weekly - 10 reps - 5 sec hold  Supine Short Arc Quad - 3 x daily - 7 x weekly - 20 reps  Supine Active Straight Leg Raise - 3 x daily - 7 x weekly - 20 reps      Plan: [x] Continue current frequency toward long and short term goals. [x] Frequency:  2 x/week for 20 visits    [x] Specific Instructions for subsequent treatments:  Focus on L patellar tracking correction.         Time In:  0752            Time Out: 1361      Electronically signed by:  Stoney Carrillo PTA

## 2021-07-20 ENCOUNTER — HOSPITAL ENCOUNTER (OUTPATIENT)
Dept: PHYSICAL THERAPY | Age: 51
Setting detail: THERAPIES SERIES
Discharge: HOME | End: 2021-07-20
Payer: COMMERCIAL

## 2021-07-20 PROCEDURE — 97016 VASOPNEUMATIC DEVICE THERAPY: CPT

## 2021-07-20 PROCEDURE — 97110 THERAPEUTIC EXERCISES: CPT

## 2021-07-20 NOTE — FLOWSHEET NOTE
[x] Count includes the [facility] &  Therapy  [address]  [phone]  F: [phone]     Physical Therapy Daily Treatment Note    Date:  2021  Patient Name:  Segundo Odom      :  1970    MRN: [id number]  Physician: Chucho Zhong                   Insurance: [employer] (limit 60 Rx)  Medical Diagnosis: Arthritis B knees M17.0                                    Rehab Codes: M25.561, M25.562, M25.661, M25.662, R26.2, R26.89, M62.551, M62.552  Onset date: 2021                      Next 's appt.: August    Visit# / total visits:    Cancels/No Shows: 0/1    Subjective:    Pain:  [x] Yes  [] No Location: B knees   Pain Rating: (0-10 scale) 8/10 R, 3/10 L   Pain altered Tx:  [] No  [] Yes  Action:  Comments:   Pt cont to report knees are slowly feeling better, R knee cont to feel stiffer than left. Objective:  Modalities: Game Ready to B knees at end of Tx, 38° mod compression with wedge post exercises. Precautions:  Exercises completed marked with \"X\" 21  Exercise   Jason knees Reps/ Time Weight/ Level Comments    SciFit 6 min Level 4   X          Stand at bars         Calf stretch  3x 15sec On slant board X   HR  20x 1.5# Added wt,incr.  Reps  X   HS curls 15x 1.5 # ' X   Hip abd  15x 1.5# ' X   Hip ext 15x 1.5# \" X   Hip flexion/SLR 15x 1.5# \" X   TKE 15x5\" blue   X   Step Ups  10x ea 6\" Lateral,fwd, added , X                    Seated       HS stretch on Stool 3x20\" ea   X   LAQ 15x3\" 1.5# W/ ball, added wt    X          Supine       Quadriceps Isometric 10x5\" ea   X   Hip Adduction Isometric 15x5\"    X   Heel Slides 15x ea    X   SLR 10x ea A     SLR w/ ER 10x ea A      Bridge w/ball 15x   X   SAQ w/ball 15x 1.5 #  X   Hooklying Clamshell 2x10 Blueberry   X          Prone       Hip flexor stretch 3x20\"ea  Strap,                                                       Other:    Treatment Charges: Mins Units   []  Modalities     [x]  Ther Exercise 45 3   []  Manual Therapy     []  Ther Activities     []  Aquatics     [x]  Vasocompression 15 1   []  Other     Total Treatment time 60 4       Assessment: [x] Progressing toward goals: improved tolerance with therex this date, with no modifications needed to complete charted therex. patient notes a gradual improvement in R knee mobility and less stiffness throughtout Tx and reports decreased pain following vaso this date. [] No change. [x] Other:     [x] Patient would benefit from skilled physical therapy services in order to: decrease knee pain, increase knee ROM, increase LE strength, and improve walking, steps, sit-stands, in/out of car, and on/off toilet. STG: (to be met in 10 treatments)  1. ? Pain: R knee average 6/10, 8/10 at worst; L knee average 4/10, 7/10 at worst  2. ? ROM: R knee 10-96°, L knee 5-116°  3. ? Strength: B knees 4/5, B hips 4-/5, R DF 4-/5  4. ? Function: LEFS 66% loss of function   5. Pt demonstrate improved gait pattern  6. Patient to be independent with home exercise program as demonstrated by performance with correct form without cues.     LTG: (to be met in 20 treatments)  1. ? Pain: R knee average 4/10, 6/10 at worst; L knee average 2/10, 5/10 at worst  2. ? ROM: R knee 6-106°  3. ? Strength: B knees 4+/5, B hips 4/5, R DF 4/5  4. ? Function: LEFS 40% loss of function   5. Pt reports improved sit-stands, in/out of car, and steps                    Patient goals: \"To be able to move freely with little or no pain, bend my knees and relief of pain. \"    Pt. Education:  [x] Yes  [] No  [x] Reviewed Prior HEP/Ed  Method of Education: [x] Verbal  [x] Demo  [] Written  Comprehension of Education:  [x] Verbalizes understanding. [] Demonstrates understanding. [x] Needs review. [] Demonstrates/verbalizes HEP/Ed previously given. 6/30/21: HEP: Access Code: [email or web address]  URL: [email or web address]  Supine Quad Set - 3 x daily - 7 x weekly - 10 reps - 5 seconds hold  Supine Hip Adduction Isometric with Ball - 3 x daily - 7 x weekly - 10 reps - 5 sec hold  Supine Short Arc Quad - 3 x daily - 7 x weekly - 20 reps  Supine Active Straight Leg Raise - 3 x daily - 7 x weekly - 20 reps       Plan: [x] Continue current frequency toward long and short term goals. [x] Frequency:  2 x/week for 20 visits    [x] Specific Instructions for subsequent treatments:  Focus on L patellar tracking correction.           Time In:  4517            Time Out: 2391      Electronically signed by:  Colin Avelar PTA

## 2021-07-22 ENCOUNTER — HOSPITAL ENCOUNTER (OUTPATIENT)
Dept: PHYSICAL THERAPY | Age: 51
Setting detail: THERAPIES SERIES
Discharge: HOME | End: 2021-07-22
Payer: COMMERCIAL

## 2021-07-22 PROCEDURE — 97110 THERAPEUTIC EXERCISES: CPT

## 2021-07-22 PROCEDURE — 97140 MANUAL THERAPY 1/> REGIONS: CPT

## 2021-07-22 NOTE — FLOWSHEET NOTE
[x] [facility]) [facility] &  Therapy  [address]  [phone]  F: [phone]     Physical Therapy Daily Treatment Note    Date:  2021  Patient Name:  Eriberto Marie      :  1970    MRN: [id number]  Physician: Charlene Cates                   Insurance: [employer] (limit 60 Rx)  Medical Diagnosis: Arthritis B knees M17.0                                    Rehab Codes: M25.561, M25.562, M25.661, M25.662, R26.2, R26.89, M62.551, M62.552  Onset date: 2021                      Next 's appt.: August    Visit# / total visits:    Cancels/No Shows: 0/1    Subjective:    Pain:  [x] Yes  [] No Location: B knees   Pain Rating: (0-10 scale) 8/10 R, 3/10 L   Pain altered Tx:  [x] No  [] Yes  Action:  Comments:  Right visibly more stiff today - reporting pain 7/10. Left is 3/10. Longer walk from parking lot today. States she can barely bend the right knee. Does her HEP and that helps in the morning to get loosened up. It is the distance walking that really gets her. Objective:  Modalities: Game Ready to B knees at end of Tx, 38° mod compression with wedge post exercises. -- Held 21  Precautions:  Exercises completed marked with \"X\" 21  Exercise   Jason knees Reps/ Time Weight/ Level Comments    SciFit 6 min Level 4   X          Stand at bars         Calf stretch  3x 15sec On slant board X   HR  20x 1.5# Added wt,incr.  Reps  X   HS curls 15x 1.5 # ' X   Hip abd  15x 1.5# ' X   Hip ext 15x 1.5# \" X   Hip flexion/SLR 15x 1.5# \" X   TKE 15x5\" blue   X   Step Ups  10x ea 6\" Lateral,fwd, added , X          Total gym squats 10 x L 35 Tactile guidance to keep knees from valgus during squat x                           Seated       HS stretch on Stool 3x20\" ea   X   LAQ 15x3\" 1.5# W/ ball, added wt    --> removed the ball from left knee on 21 due to pain X   Hamstring curl 15 Blueberry  x                        Supine       Quadriceps Isometric 10x5\" ea   x   Hip Adduction Isometric 15x5\"       Heel Slides 15x ea 1.5 lb   X   SLR 10x ea 1.5 lb  x   SLR w/ ER 10x ea 1.5 lb   x   Bridge w/ball 15x   X   SAQ w/ball 15x 1.5 #  X   Hooklying Clamshell 2x10 Blueberry   X   Manual to distal quad 6 min  Distal quad MFR, [place] to quds bilaterally  x                 Prone       Hip flexor stretch 3x20\"ea   Strap x                                                    Other:    Treatment Charges: Mins Units   []  Modalities     [x]  Ther Exercise 50 3   [x]  Manual Therapy 6 1   []  Ther Activities     []  Aquatics     []  Vasocompression     []  Other     Total Treatment time 56        Assessment: [x] Progressing toward goals: Resumed SLR and SLR with ER. Added total gym squats, though they did cause increased bilateral knee pain. Tactile cues for positioning during squat. Added seated hamstring curl and added manual to distal quads. Vaso was held in error on this date, though patient did not ask for cold after Rx. Did not c/o increased pain after Rx.      [] No change. [] Other:   [x] Patient would benefit from skilled physical therapy services in order to: decrease knee pain, increase knee ROM, increase LE strength, and improve walking, steps, sit-stands, in/out of car, and on/off toilet. STG: (to be met in 10 treatments)  1. ? Pain: R knee average 6/10, 8/10 at worst; L knee average 4/10, 7/10 at worst  2. ? ROM: R knee 10-96°, L knee 5-116°  3. ? Strength: B knees 4/5, B hips 4-/5, R DF 4-/5  4. ? Function: LEFS 66% loss of function   5. Pt demonstrate improved gait pattern  6. Patient to be independent with home exercise program as demonstrated by performance with correct form without cues.     LTG: (to be met in 20 treatments)  1. ? Pain: R knee average 4/10, 6/10 at worst; L knee average 2/10, 5/10 at worst  2. ? ROM: R knee 6-106°  3. ? Strength: B knees 4+/5, B hips 4/5, R DF 4/5  4. ? Function: LEFS 40% loss of function   5.  Pt reports improved sit-stands, in/out of car, and steps                    Patient goals: \"To be able to move freely with little or no pain, bend my knees and relief of pain. \"    Pt. Education:  [x] Yes  [] No  [x] Reviewed Prior HEP/Ed  Method of Education: [x] Verbal  [x] Demo  [] Written  Comprehension of Education:  [x] Verbalizes understanding. [] Demonstrates understanding. [x] Needs review. [] Demonstrates/verbalizes HEP/Ed previously given. 6/30/21: HEP: Access Code: [email or web address]  URL: [email or web address]  Supine Quad Set - 3 x daily - 7 x weekly - 10 reps - 5 seconds hold  Supine Hip Adduction Isometric with Ball - 3 x daily - 7 x weekly - 10 reps - 5 sec hold  Supine Short Arc Quad - 3 x daily - 7 x weekly - 20 reps  Supine Active Straight Leg Raise - 3 x daily - 7 x weekly - 20 reps       Plan: [x] Continue current frequency toward long and short term goals. [x] Frequency:  2 x/week for 20 visits    [x] Specific Instructions for subsequent treatments:  Focus on L patellar tracking correction.      Time In:  1158            Time Out: 0805      Electronically signed by:  Sebastian Crane PT

## 2021-07-27 ENCOUNTER — HOSPITAL ENCOUNTER (OUTPATIENT)
Dept: PHYSICAL THERAPY | Age: 51
Setting detail: THERAPIES SERIES
Discharge: HOME | End: 2021-07-27
Payer: COMMERCIAL

## 2021-07-27 PROCEDURE — 97110 THERAPEUTIC EXERCISES: CPT

## 2021-07-27 NOTE — FLOWSHEET NOTE
[x] [facility]) [facility] &  Therapy  [address]  [phone]  F: [phone]     Physical Therapy Daily Treatment Note    Date:  2021  Patient Name:  Fawn Woo      :  1970    MRN: [id number]  Physician: Evie Mcclure                   Insurance: [facility] [employer] (limit 60 Rx)  Medical Diagnosis: Arthritis B knees M17.0                                    Rehab Codes: M25.561, M25.562, M25.661, M25.662, R26.2, R26.89, M62.551, M62.552  Onset date: 2021                      Next Dr's appt.: August    Visit# / total visits:    Cancels/No Shows: 0/1    Subjective:    Pain:  [x] Yes  [] No Location: B knees   Pain Rating: (0-10 scale) 6/10 R, 3/10 L   Pain altered Tx:  [x] No  [] Yes  Action:  Comments:  Pt reports R knee cont to bother her. Pt reports she didn't notice a difference with not doing game ready last Rx, declined this date. Objective:  Modalities: Game Ready to B knees at end of Tx, 38° mod compression with wedge post exercises.  -- Held, Pt declined  Precautions:  Exercises completed marked with \"X\" 21  Exercise   Jason knees Reps/ Time Weight/ Level Comments    SciFit 6 min Level 4   X          Stand at bars    progressed weight     Calf stretch  3x 15sec On slant board x   HR  20x 2 lbs  x   HS curls 15x 2 lbs  x   Hip abd  15x 2 lbs  x   Hip ext 15x 2 lbs  x   Hip flexion/SLR 15x 2 lbs  x   TKE 15x5\" blue  x   Step Ups  10x ea 6\" Lateral,fwd x          Total gym squats 10 x L 35 Verbal cues to keep knees from valgus during squat x                           Seated       HS stretch on Stool 3x20\" ea   x   LAQ 15x3\" 2 lbs W/ ball, progressed weight  x   Hamstring curl 15x Blueberry  x                        Supine       Quadriceps Isometric 10x5\" ea   x   Hip Adduction Isometric 15x5\"    x   Heel Slides 15x ea 2 lb  progressed weight 7/27 x   SLR 10x ea  2 lb progressed weight 7/27 x   SLR w/ ER 10x ea 2 lb  progressed weight 7/27 x   Bridge w/ball 15x   x   SAQ w/ball 15x 2 # progressed weight 7/27 x   Hooklying Clamshell 2x10 Blueberry   x   Manual to distal quad 5 min  Distal quad MFR, tiger tail to quads bilaterally  x                 Prone       Hip flexor stretch 3x20\"ea   Strap x                                                    Other:    Treatment Charges: Mins Units   []  Modalities     [x]  Ther Exercise 41 3   [x]  Manual Therapy 5 --   []  Ther Activities     []  Aquatics     []  Vasocompression     []  Other     Total Treatment time 46        Assessment: [x] Progressing toward goals:  Pt able to tolerate increased weights standing and mat ex visibly fatigued by final reps most ex, nonverbals indicate Pt w/mild to moderate difficulty completing ex. Pt complains of pain L knee w/SAQ, improves with increased focus on squeezing ball w/b/t knees. Pt declined game ready, educated Pt to monitor symptoms for rest of day, if notes increased pain, soreness will resume game ready. [] No change. [] Other:   [x] Patient would benefit from skilled physical therapy services in order to: decrease knee pain, increase knee ROM, increase LE strength, and improve walking, steps, sit-stands, in/out of car, and on/off toilet. STG: (to be met in 10 treatments)  1. ? Pain: R knee average 6/10, 8/10 at worst; L knee average 4/10, 7/10 at worst  2. ? ROM: R knee 10-96°, L knee 5-116°  3. ? Strength: B knees 4/5, B hips 4-/5, R DF 4-/5  4. ? Function: LEFS 66% loss of function   5. Pt demonstrate improved gait pattern  6. Patient to be independent with home exercise program as demonstrated by performance with correct form without cues.     LTG: (to be met in 20 treatments)  1. ? Pain: R knee average 4/10, 6/10 at worst; L knee average 2/10, 5/10 at worst  2. ? ROM: R knee 6-106°  3. ? Strength: B knees 4+/5, B hips 4/5, R DF 4/5  4. ? Function: LEFS 40% loss of function   5.  Pt reports improved sit-stands, in/out of car, and steps    Patient goals: \"To be able to move freely with little or no pain, bend my knees and relief of pain. \"    Pt. Education:  [x] Yes  [] No  [x] Reviewed Prior HEP/Ed  Method of Education: [x] Verbal  [x] Demo  [] Written  Comprehension of Education:  [x] Verbalizes understanding. [] Demonstrates understanding. [x] Needs review. [] Demonstrates/verbalizes HEP/Ed previously given. 6/30/21: HEP: Access Code: [email or web address]  URL: [email or web address]  Supine Quad Set - 3 x daily - 7 x weekly - 10 reps - 5 seconds hold  Supine Hip Adduction Isometric with Ball - 3 x daily - 7 x weekly - 10 reps - 5 sec hold  Supine Short Arc Quad - 3 x daily - 7 x weekly - 20 reps  Supine Active Straight Leg Raise - 3 x daily - 7 x weekly - 20 reps       Plan: [x] Continue current frequency toward long and short term goals. [x] Frequency:  2 x/week for 20 visits    [x] Specific Instructions for subsequent treatments:  Focus on L patellar tracking correction.      Time In:  0768            Time Out: 1145      Electronically signed by:  Racheal Merino, PT

## 2021-07-29 ENCOUNTER — HOSPITAL ENCOUNTER (OUTPATIENT)
Dept: PHYSICAL THERAPY | Age: 51
Setting detail: THERAPIES SERIES
Discharge: HOME | End: 2021-07-29
Payer: COMMERCIAL

## 2021-07-29 PROCEDURE — 97110 THERAPEUTIC EXERCISES: CPT

## 2021-07-29 NOTE — FLOWSHEET NOTE
Bridge w/ball 15x   x   SAQ w/ball 15x 2 # progressed weight 7/27 x   Hooklying Clamshell 2x10 Blueberry   x   Manual to distal quad 5 min  Distal quad MFR, tiger tail to quads bilaterally                   Prone       Hip flexor stretch 3x20\"ea   Strap x                                                    Other: * Held ankle weights for all exercise this date due to increased pain. Treatment Charges: Mins Units   []  Modalities     [x]  Ther Exercise 45 3   [x]  Manual Therapy  --   []  Ther Activities     []  Aquatics     []  Vasocompression     []  Other     Total Treatment time 45        Assessment: [x] Progressing toward goals:  Patient struggled through her exercises today especially flexion based exercises. Held ankle resistance due to high pain level. Plane to re-integrate ankle weights next session    [] No change. [] Other:   [x] Patient would benefit from skilled physical therapy services in order to: decrease knee pain, increase knee ROM, increase LE strength, and improve walking, steps, sit-stands, in/out of car, and on/off toilet. STG: (to be met in 10 treatments)  1. ? Pain: R knee average 6/10, 8/10 at worst; L knee average 4/10, 7/10 at worst  2. ? ROM: R knee 10-96°, L knee 5-116°  3. ? Strength: B knees 4/5, B hips 4-/5, R DF 4-/5  4. ? Function: LEFS 66% loss of function   5. Pt demonstrate improved gait pattern  6. Patient to be independent with home exercise program as demonstrated by performance with correct form without cues.     LTG: (to be met in 20 treatments)  1. ? Pain: R knee average 4/10, 6/10 at worst; L knee average 2/10, 5/10 at worst  2. ? ROM: R knee 6-106°  3. ? Strength: B knees 4+/5, B hips 4/5, R DF 4/5  4. ? Function: LEFS 40% loss of function   5. Pt reports improved sit-stands, in/out of car, and steps                    Patient goals: \"To be able to move freely with little or no pain, bend my knees and relief of pain. \"    Pt.  Education:  [x] Yes  [] No  [x]

## 2021-08-03 ENCOUNTER — HOSPITAL ENCOUNTER (OUTPATIENT)
Dept: PHYSICAL THERAPY | Age: 51
Setting detail: THERAPIES SERIES
Discharge: HOME | End: 2021-08-03
Payer: COMMERCIAL

## 2021-08-03 PROCEDURE — 97110 THERAPEUTIC EXERCISES: CPT

## 2021-08-03 NOTE — FLOWSHEET NOTE
[x] [facility]) [facility] &  Therapy  [address]  [phone]  F: [phone]     Physical Therapy Daily Treatment Note    Date:  8/3/2021  Patient Name:  Andrew Eden      :  1970    MRN: [id number]  Physician: Torres Barajas                   Insurance: [employer] (limit 60 Rx)  Medical Diagnosis: Arthritis B knees M17.0                                    Rehab Codes: M25.561, M25.562, M25.661, M25.662, R26.2, R26.89, M62.551, M62.552  Onset date: 2021                      Next 's appt.: August    Visit# / total visits: 10/20   Cancels/No Shows: 0/1    Subjective:    Pain:  [x] Yes  [] No Location: B knees   Pain Rating: (0-10 scale) 8/10 R, 6/10 L   Pain altered Tx:  [x] No  [x] Yes  Action: Decreased resistance, limited ex  Comments:  Pt reports knees have been bad over past week, unknown cause, reports walking is bad. Objective:  Modalities: Game Ready to B knees at end of Tx, 38° mod compression with wedge post exercises.  -- Held  HP (large) B knees, at end of Rx, in supine w/wedge-added 8/3  Precautions:  Exercises completed marked with \"X\" 21  Exercise   Jason knees Reps/ Time Weight/ Level Comments    SciFit 6 min Level 2 Decreased resistance 8/3   X          Stand at bars    Held all weights 8/3    Calf stretch  3x 15sec On slant board x   HR  20x   x   HS curls 15x   x   Hip abd  15x   x   Hip ext 15x  R OKC only 8/3 x   Hip flexion/SLR 15x  R OKC only 8/3 x   TKE 15x5\" blue     Step Ups  10x ea 6\" Lateral,fwd        --   Total gym squats 10 x L 35 Verbal cues to keep knees from valgus during squat --                           Seated       HS stretch on Stool 3x20\" ea   x   LAQ 15x3\"  W/ ball x   Hamstring curl 15x lime Decreased resistance 8/3  x   Heel/toe raises  15x  Added 8/3 x                 Supine   Held all weights 8/3    Quadriceps Isometric 10x5\" ea   x   Hip Adduction Isometric 15x5\"    x   Heel Slides 15x ea  Orange slide tube x   SLR 10x ea   x   SLR w/ ER 10x ea   x   Bridge w/ball 10x  Decreased reps 8/3 x   SAQ w/ball 15x   x   Hooklying Clamshell 2x10 Blueberry   x   Manual to distal quad 8 min  Distal quad MFR, tiger tail to quads bilaterally  x                 Prone       Hip flexor stretch 3x20\"ea   Strap                                                     Other: * Held ankle weights for all exercise this date due to increased pain. Treatment Charges: Mins Units   [x]  Modalities HP 15 --   [x]  Ther Exercise 42 3   [x]  Manual Therapy 8 --   []  Ther Activities     []  Aquatics     []  Vasocompression     []  Other     Total Treatment time 50 min        Assessment: [x] Progressing toward goals:  Limited ex due to increased pain, held all weights. Pt reports manual to quads felt good and seemed to help, cont at end of ex. Held recheck goals due to increased pain. Trial HP at end of Rx to decrease pain, Pt reports it felt good. [] No change. [] Other:   [x] Patient would benefit from skilled physical therapy services in order to: decrease knee pain, increase knee ROM, increase LE strength, and improve walking, steps, sit-stands, in/out of car, and on/off toilet. STG: (to be met in 10 treatments)  1. ? Pain: R knee average 6/10, 8/10 at worst; L knee average 4/10, 7/10 at worst  2. ? ROM: R knee 10-96°, L knee 5-116°  3. ? Strength: B knees 4/5, B hips 4-/5, R DF 4-/5  4. ? Function: LEFS 66% loss of function   5. Pt demonstrate improved gait pattern  6. Patient to be independent with home exercise program as demonstrated by performance with correct form without cues.     LTG: (to be met in 20 treatments)  1. ? Pain: R knee average 4/10, 6/10 at worst; L knee average 2/10, 5/10 at worst  2. ? ROM: R knee 6-106°  3. ? Strength: B knees 4+/5, B hips 4/5, R DF 4/5  4. ? Function: LEFS 40% loss of function   5. Pt reports improved sit-stands, in/out of car, and steps                    Patient goals:  \"To be able to move freely with little or no pain, bend my knees and relief of pain. \"    Pt. Education:  [x] Yes  [] No  [x] Reviewed Prior HEP/Ed  Method of Education: [x] Verbal  [x] Demo  [] Written  Comprehension of Education:  [x] Verbalizes understanding. [] Demonstrates understanding. [x] Needs review. [] Demonstrates/verbalizes HEP/Ed previously given. 6/30/21: HEP: Access Code: [email or web address]  URL: [email or web address]  Supine Quad Set - 3 x daily - 7 x weekly - 10 reps - 5 seconds hold  Supine Hip Adduction Isometric with Ball - 3 x daily - 7 x weekly - 10 reps - 5 sec hold  Supine Short Arc Quad - 3 x daily - 7 x weekly - 20 reps  Supine Active Straight Leg Raise - 3 x daily - 7 x weekly - 20 reps       Plan: [x] Continue current frequency toward long and short term goals.    [x] Frequency:  2 x/week for 20 visits    [x] Specific Instructions for subsequent treatments:  Focus on L patellar tracking correction; recheck goals, resume prior ex per Pt tolerance      Time In:  6729            Time Out: 4276      Electronically signed by:  Rodo Jasmine, PT

## 2021-08-05 ENCOUNTER — HOSPITAL ENCOUNTER (OUTPATIENT)
Dept: PHYSICAL THERAPY | Age: 51
Setting detail: THERAPIES SERIES
Discharge: HOME | End: 2021-08-05
Payer: COMMERCIAL

## 2021-08-05 NOTE — FLOWSHEET NOTE
[x] [facility] &  Therapy  [address]    [phone]  F: [phone]          Physical Therapy Cancel/No Show note    Date: 2021  Patient: Deana Domingo  : 1970  MRN: [id number]     Cancels/No Shows to date:     For today's appointment patient:    [x]  Cancelled    [] Rescheduled appointment    [] No-show     Reason given by patient:    []  Patient ill    []  Conflicting appointment    [] No transportation      [] Conflict with work    [] No reason given    [] Weather related    [] COVID-19    [x] Other:      Comments:  Pt's knees are in too much pain for therapy today, she will try to make 8/10/2021 appt.         [x] Next appointment was confirmed     Electronically signed by: Pao Villarreal PT

## 2021-08-10 ENCOUNTER — HOSPITAL ENCOUNTER (OUTPATIENT)
Dept: PHYSICAL THERAPY | Age: 51
Setting detail: THERAPIES SERIES
Discharge: HOME | End: 2021-08-10
Payer: COMMERCIAL

## 2021-08-10 PROCEDURE — 97110 THERAPEUTIC EXERCISES: CPT

## 2021-08-10 PROCEDURE — 97140 MANUAL THERAPY 1/> REGIONS: CPT

## 2021-08-10 NOTE — FLOWSHEET NOTE
[x] [facility]) [facility] &  Therapy  [address]  [phone]  F: [phone]     Physical Therapy Daily Treatment Note    Date:  8/10/2021  Patient Name:  Fawn Woo      :  1970    MRN: [id number]  Physician: Evie Mcclure                   Insurance: Oumou Newton (limit 60 Rx)  Medical Diagnosis: Arthritis B knees M17.0                                    Rehab Codes: M25.561, M25.562, M25.661, M25.662, R26.2, R26.89, M62.551, M62.552  Onset date: 2021                      Next 's appt.: 2021    Visit# / total visits:    Cancels/No Shows: 0/1    Subjective:    Pain:  [x] Yes  [] No Location: B knees   Pain Rating: (0-10 scale) 8/10 R, 6/10 L   Pain altered Tx:  [] No  [x] Yes  Action: Decreased resistance, limited ex  Comments:  Pt reports knees cont to be bad, barely made it in to Rx today. Pt reports walking cont to aggravate it, lots of difficulty w/grocery shopping. Objective:  Modalities: Game Ready to B knees at end of Tx, 38° mod compression with wedge post exercises.  -- Held  HP (large) B knees, at end of Rx, in supine w/wedge-added 8/3  Precautions:  Exercises completed marked with \"X\" 08/10/21  Exercise   Jason knees Reps/ Time Weight/ Level Comments    SciFit 6 min Level 2 Decreased resistance 8/3   X          Stand at bars    Held all weights 8/3    Calf stretch  3x 15sec On slant board x   HR  20x   x   HS curls 15x   x   Hip abd  15x   x   Hip ext   R OKC only 8/3    Hip flexion/SLR   R OKC only 8/3    TKE  blue     Step Ups  --- 6\" Lateral,fwd        --   Total gym squats  L 35 Verbal cues to keep knees from valgus during squat --                           Seated       HS stretch on Stool 3x20\" ea   x   LAQ 15x3\"  W/ ball    Hamstring curl  lime Decreased resistance 8/3  --   Heel/toe raises  15x   x   Heel slides  15x  Added 8/10, w/pillowcase under foot x          Supine   Held all weights 8/3    Quadriceps Isometric 15x5\" ea   x   Hip Adduction Isometric 15x5\"    x   Heel Slides 15x ea  Orange slide tube x   SLR       SLR w/ ER       Bridge w/ball   Decreased reps 8/3    SAQ w/ball 15x  Held ball 8/10 x   Hooklying Clamshell 2x10 Blueberry   --   Manual to distal quad 9 min  Distal quad MFR, \"the stick\" to quads bilaterally  x                 Prone       Hip flexor stretch    Strap                                                     Other: * Held ankle weights for all exercise this date due to increased pain. ROM DEGREES A/P ROM DEGREES A/P STRENGTH  STRENGTH     LEFT  RIGHT LEFT RIGHT   HIP FLEX       HIPEXT       HIP ER       HIP IR       HIP ABD       HIP ADD              KNEE FLEX * *     KNEE EXT * *            ANKLE DF                    PF                    INV                    EVER           Treatment Charges: Mins Units   [x]  Modalities HP 15 --   [x]  Ther Exercise 32 2   [x]  Manual Therapy 9 1   []  Ther Activities     []  Aquatics     []  Vasocompression     []  Other     Total Treatment time 41 min        Assessment: [] Progressing toward goals: Cont HP at end of Rx to decrease pain, Pt reports it felt good. [x] No change. Cont w/even more limited ex due to pain, cont to hold all weights. LEFS 81% loss of LE function. [] Other:   [x] Patient would benefit from skilled physical therapy services in order to: decrease knee pain, increase knee ROM, increase LE strength, and improve walking, steps, sit-stands, in/out of car, and on/off toilet. STG: (to be met in 10 treatments)  1. ? Pain: R knee average 6/10, 8/10 at worst; L knee average 4/10, 7/10 at worst-Min Progress Toward  2. ? ROM: R knee 10-96°, L knee 5-116°  3. ? Strength: B knees 4/5, B hips 4-/5, R DF 4-/5  4. ? Function: LEFS 66% loss of function-No Change    5. Pt demonstrate improved gait pattern-No Change  6.  Patient to be independent with home exercise program as demonstrated by performance with correct form without cues-Met for current HEP     LTG: (to be met in 20 treatments)  1. ? Pain: R knee average 4/10, 6/10 at worst; L knee average 2/10, 5/10 at worst  2. ? ROM: R knee 6-106°  3. ? Strength: B knees 4+/5, B hips 4/5, R DF 4/5  4. ? Function: LEFS 40% loss of function   5. Pt reports improved sit-stands, in/out of car, and steps                    Patient goals: \"To be able to move freely with little or no pain, bend my knees and relief of pain. \"    Pt. Education:  [x] Yes  [] No  [x] Reviewed Prior HEP/Ed  Method of Education: [x] Verbal  [x] Demo  [] Written  Comprehension of Education:  [x] Verbalizes understanding. [] Demonstrates understanding. [x] Needs review. [] Demonstrates/verbalizes HEP/Ed previously given. 6/30/21: HEP: Access Code: [email or web address]  URL: [email or web address]  Supine Quad Set - 3 x daily - 7 x weekly - 10 reps - 5 seconds hold  Supine Hip Adduction Isometric with Ball - 3 x daily - 7 x weekly - 10 reps - 5 sec hold  Supine Short Arc Quad - 3 x daily - 7 x weekly - 20 reps  Supine Active Straight Leg Raise - 3 x daily - 7 x weekly - 20 reps       Plan: [x] Continue current frequency toward long and short term goals.    [x] Frequency:  2 x/week for 20 visits    [x] Specific Instructions for subsequent treatments: check knee ROM, resume prior ex per Pt tolerance; consider US      Time In:  0710            Time Out: 0799      Electronically signed by:  Kayla Diaz, PT

## 2021-08-13 ENCOUNTER — HOSPITAL ENCOUNTER (OUTPATIENT)
Dept: PHYSICAL THERAPY | Age: 51
Setting detail: THERAPIES SERIES
Discharge: HOME | End: 2021-08-13
Payer: COMMERCIAL

## 2021-08-13 PROCEDURE — 97110 THERAPEUTIC EXERCISES: CPT

## 2021-08-13 PROCEDURE — 97140 MANUAL THERAPY 1/> REGIONS: CPT

## 2021-08-13 NOTE — FLOWSHEET NOTE
[x] [facility]  [facility]  [address]  [phone]  F: [phone]     Physical Therapy Daily Treatment Note    Date:  2021  Patient Name:  Vi Villarreal      :  1970    MRN: [id number]  Physician: Mary Kay Stevenson                   Insurance: Kenzie Trejo (limit 60 Rx)  Medical Diagnosis: Arthritis B knees M17.0                                    Rehab Codes: M25.561, M25.562, M25.661, M25.662, R26.2, R26.89, M62.551, M62.552  Onset date: 2021                      Next 's appt.: 2021    Visit# / total visits:    Cancels/No Shows: 0/1    Subjective:    Pain:  [x] Yes  [] No Location: B knees   Pain Rating: (0-10 scale) 8/10 R, 6/10 L   Pain altered Tx:  [] No  [x] Yes  Action: Decreased resistance, limited ex  Comments:  Pt reports can't bend her right knee. Both knees cont to be painful. Objective:  Modalities: Game Ready to B knees at end of Tx, 38° mod compression with wedge post exercises.  -- Held  HP (large) B knees, at end of Rx, in supine w/wedge-added 8/3  Precautions:  Exercises completed marked with \"X\" 21  Exercise   Jason knees Reps/ Time Weight/ Level Comments    SciFit 6 min Level 2  x          Stand at bars    Held all weights     Calf stretch  3x 15sec On slant board x   HR  20x   x   HS curls 15x   x   Hip abd  15x   x   Hip ext 10x   x   Hip flexion/SLR 10x  R OKC only  x   TKE  blue     Step Ups  --- 6\" Lateral,fwd        --   Total gym squats  L 35 Verbal cues to keep knees from valgus during squat --                           Seated       HS stretch on Stool 3x20\" ea   x   LAQ 15x3\"  W/ ball x   Hamstring curl  lime Decreased resistance 8/3  --   Heel/toe raises  15x   x   Heel slides  15x  w/pillowcase under foot x          Supine   Hold all weights     Quadriceps Isometric 15x5\" ea   x   Hip Adduction Isometric 15x5\"    x   Heel Slides 15x ea  Orange slide tube x   SLR       SLR w/ ER       Bridge w/ball   Decreased reps 8/3    SAQ w/ball 15x   x   Hooklying Clamshell 2x10 Blueberry   --   Manual to distal quad 9 min  Distal quad MFR, hypervolt to quads bilaterally  x                 Prone       Hip flexor stretch    Strap                                                     Other: * Held ankle weights for all exercise this date due to increased pain. 8/13 ROM DEGREES A/P ROM DEGREES A/P STRENGTH  STRENGTH     LEFT  RIGHT LEFT RIGHT   HIP FLEX   4- 3+p          KNEE FLEX 102°p 73°p     KNEE EXT 4° 13° 4-p 4-p          ANKLE DF   4 4              Treatment Charges: Mins Units   [x]  Modalities HP 15 --   [x]  Ther Exercise 34 2   [x]  Manual Therapy 10 1   []  Ther Activities     []  Aquatics     []  Vasocompression     []  Other     Total Treatment time 44 min        Assessment: [] Progressing toward goals: Trial hypervolt for STM this date, Pt reports feels good. Cont HP at end of Rx to decrease pain, Pt reports it felt good. [x] No change. Cont w/greatly limited ex due to pain, cont to hold all weights. [] Other:   [x] Patient would benefit from skilled physical therapy services in order to: decrease knee pain, increase knee ROM, increase LE strength, and improve walking, steps, sit-stands, in/out of car, and on/off toilet. STG: (to be met in 10 treatments)  1. ? Pain: R knee average 6/10, 8/10 at worst; L knee average 4/10, 7/10 at worst-Min Progress Toward  2. ? ROM: R knee 10-96°, L knee 5-116°--Progress toward ext B, no change Flex B   3. ? Strength: B knees 4/5, B hips 4-/5, R DF 4-/5-Met DF, Progress toward L hip,knee   4. ? Function: LEFS 66% loss of function-No Change    5. Pt demonstrate improved gait pattern-No Change  6.  Patient to be independent with home exercise program as demonstrated by performance with correct form without cues-Met for current HEP     LTG: (to be met in 20 treatments)  1. ? Pain: R knee average 4/10, 6/10 at worst; L knee average 2/10, 5/10 at worst  2. ? ROM: R knee 6-106°  3. ? Strength: B knees 4+/5, B hips 4/5, R DF 4/5  4. ? Function: LEFS 40% loss of function   5. Pt reports improved sit-stands, in/out of car, and steps                    Patient goals: \"To be able to move freely with little or no pain, bend my knees and relief of pain. \"    Pt. Education:  [x] Yes  [] No  [x] Reviewed Prior HEP/Ed  Method of Education: [x] Verbal  [x] Demo  [] Written  Comprehension of Education:  [x] Verbalizes understanding. [] Demonstrates understanding. [x] Needs review. [] Demonstrates/verbalizes HEP/Ed previously given. 6/30/21: HEP: Access Code: [email or web address]  URL: [email or web address]  Supine Quad Set - 3 x daily - 7 x weekly - 10 reps - 5 seconds hold  Supine Hip Adduction Isometric with Ball - 3 x daily - 7 x weekly - 10 reps - 5 sec hold  Supine Short Arc Quad - 3 x daily - 7 x weekly - 20 reps  Supine Active Straight Leg Raise - 3 x daily - 7 x weekly - 20 reps       Plan: [x] Continue current frequency toward long and short term goals.    [x] Frequency:  2 x/week for 20 visits    [x] Specific Instructions for subsequent treatments:  resume prior ex per Pt tolerance; consider US      Time In:  2719            Time Out: 5783      Electronically signed by:  Audrey Abraham, PT

## 2021-08-16 ENCOUNTER — HOSPITAL ENCOUNTER (OUTPATIENT)
Dept: PHYSICAL THERAPY | Age: 51
Setting detail: THERAPIES SERIES
Discharge: HOME | End: 2021-08-16
Payer: COMMERCIAL

## 2021-08-16 PROCEDURE — 97140 MANUAL THERAPY 1/> REGIONS: CPT

## 2021-08-16 PROCEDURE — 97110 THERAPEUTIC EXERCISES: CPT

## 2021-08-16 NOTE — PROGRESS NOTES
[x] [facility] &  Therapy  [address]  [phone]  F: [phone]         Physical Therapy Progress Note    Date: 2021      Patient: Sherie Johnson  : 1970  MRN: [id number]    Physician: Roberto Valdez                   Insurance: [employer] (limit 60 Rx)  Medical Diagnosis: Arthritis B knees M17.0                                    Rehab Codes: M25.561, M25.562, M25.661, M25.662, R26.2, R26.89, M62.551, M62.552  Onset date: 2021                               Next 's appt.: 2021     Total visits attended: 15  Cancels/No shows:   Date range of services: 2021 to 2021    Subjective:  Pain:  [x]? Yes  []? No   Location: B knees         Pain Rating: (0-10 scale) 7/10 R, 6/10 L   Pain altered Tx:  []? No  [x]? Yes  Action: Decreased resistance, limited ex  Comments: Pt w/gradual improvement first 6 Rx, then reported increased symptoms 2021. Pt cont w/difficulty bending knees, and increased pain after walking too much. After resting 2 days pain at 7/10 R knee and 6/10 L knee. Objective:  Test Measurements:    ROM DEGREES A/P ROM DEGREES A/P STRENGTH  STRENGTH      LEFT  RIGHT LEFT RIGHT   HIP FLEX     4- 3+p               KNEE FLEX 102°p 73°p       KNEE EXT 4° 13° 4-p 4-p               ANKLE DF     4 4                  Function: LEFS 81% loss of LE function. Assessment:  STG: (to be met in 10 treatments)  1. ? Pain: R knee average 6/10, 8/10 at worst; L knee average 4/10, 7/10 at worst-Min Progress Toward  2. ? ROM: R knee 10-96°, L knee 5-116°-Progress toward ext B, no change Flex B   3. ? Strength: B knees 4/5, B hips 4-/5, R DF 4-/5 -Met DF, Progress toward L hip,knee   4. ? Function: LEFS 66% loss of function-No Change    5. Pt demonstrate improved gait pattern-No Change  6.  Patient to be independent with home exercise program as demonstrated by performance with correct form without cues-Met for current HEP  LTG: (to be met in 20 treatments)  7. ? Pain: R knee average 4/10, 6/10 at worst; L knee average 2/10, 5/10 at worst  8. ? ROM: R knee 6-106°  9. ? Strength: B knees 4+/5, B hips 4/5, R DF 4/5  10. ? Function: LEFS 40% loss of function   11. Pt reports improved sit-stands, in/out of car, and steps    Treatment Plan:  [x] Therapeutic Exercise   93695  [] Iontophoresis: 4 mg/mL Dexamethasone Sodium Phosphate  mAmin  28447   [] Therapeutic Activity  40272 [x] Vasopneumatic cold with compression  00474    [] Gait Training   20666 [] Ultrasound   [id number]   [] Neuromuscular Re-education  32107 [] Electrical Stimulation Unattended  09318   [x] Manual Therapy  57907 [] Electrical Stimulation Attended  23163   [x] Instruction in HEP  [] Lumbar/Cervical Traction  80885   [] Aquatic Therapy   72705 [x] Cold/hotpack    [] Massage   36911      [] Dry Needling, 1 or 2 muscles  91368   [] Biofeedback, first 15 minutes   32644  [] Biofeedback, additional 15 minutes   57989 [] Dry Needling, 3 or more muscles  40195       Patient Status:     [] Continue per initial plan of care. [] Additional visits necessary. [x] Other: Pt w/gradual improvement first 6 Rx, then reported increased symptoms 7/22/2021. Pt cont w/increased symptoms, limited progress since that time. Await further instructions from physician. If no further orders received will discharge due to lack of progress. Electronically signed by Azalea Bradford PT on 8/16/2021 at 3:38 PM        If you have any questions or concerns, please don't hesitate to call. Thank you for your referral.    Physician Signature:________________________________Date:__________________  By signing above or cosigning this note, I have reviewed this plan of care and certify a need for medically necessary rehabilitation services.      *PLEASE SIGN ABOVE AND FAX BACK ALL PAGES*

## 2021-08-16 NOTE — FLOWSHEET NOTE
[x] [facility]) [facility] &  Therapy  [address]  [phone]  F: [phone]     Physical Therapy Daily Treatment Note    Date:  2021  Patient Name:  Denis Chávez      :  1970    MRN: [id number]  Physician: Darya Alston                   Insurance: [facility] [employer] (limit 60 Rx)  Medical Diagnosis: Arthritis B knees M17.0                                    Rehab Codes: M25.561, M25.562, M25.661, M25.662, R26.2, R26.89, M62.551, M62.552  Onset date: 2021                      Next 's appt.: 2021    Visit# / total visits:    Cancels/No Shows: 0/1    Subjective:    Pain:  [x] Yes  [] No Location: B knees   Pain Rating: (0-10 scale) 7/10 R, 6/10 L   Pain altered Tx:  [] No  [x] Yes  Action: Decreased resistance, limited ex  Comments:  Pt reports she rested all weekend so pain isn't as bad today, but it still hurts. Pt reports hyper volt for manual felt good at last Rx. Objective:  Modalities: Game Ready to B knees at end of Tx, 38° mod compression with wedge post exercises.  -- Held  HP (large) B knees, at end of Rx, in supine w/wedge-added 8/3  Precautions:  Exercises completed marked with \"X\" 21  Exercise   Jason knees Reps/ Time Weight/ Level Comments    SciFit 6 min Level 3  x          Stand at bars    Held all weights     Calf stretch  3x 15sec On slant board x   HR  20x   x   HS curls 15x   x   Hip abd  15x   x   Hip ext 10x   x   Hip flexion/SLR 10x   x   TKE  blue     Step Ups  --- 6\" Lateral,fwd        --   Total gym squats  L 35 Verbal cues to keep knees from valgus during squat --                           Seated       HS stretch on Stool 3x20\" ea   x   LAQ 15x3\"  W/ ball x   Hamstring curl  lime     Heel/toe raises  20x  Progressed reps  x   Heel slides  15x  w/pillowcase under foot x          Supine   Hold all weights     Quadriceps Isometric 15x5\" ea   x   Hip Adduction Isometric 15x5\"    x   Heel Slides 15x ea  Orange slide tube x   SLR       SLR w/ ER       Bridge w/ball   Decreased reps 8/3    SAQ w/ball 15x  L harder to bend, but less pain x   Hooklying Clamshell 2x10 Blueberry      Manual to distal quad 9 min  Distal quad MFR, hypervolt to quads bilaterally                   Prone       Hip flexor stretch    Strap                                                     Other: * Held ankle weights for all exercise this date due to increased pain. 8/13 ROM DEGREES A/P ROM DEGREES A/P STRENGTH  STRENGTH     LEFT  RIGHT LEFT RIGHT   HIP FLEX   4- 3+p          KNEE FLEX 102°p 73°p     KNEE EXT 4° 13° 4-p 4-p          ANKLE DF   4 4              Treatment Charges: Mins Units   [x]  Modalities HP 15 --   [x]  Ther Exercise 36 2   [x]  Manual Therapy 9 1   []  Ther Activities     []  Aquatics     []  Vasocompression     []  Other     Total Treatment time 45 min        Assessment: [x] Progressing toward goals:  Pt able to tolerate increased reps seated heel/toe raises. Educated Pt in standing ex for HEP, issued handouts. Cont HP at end of Rx to decrease pain. [x] No change. Cont w/limited ex due to pain, cont to hold all weights. [] Other:   [x] Patient would benefit from skilled physical therapy services in order to: decrease knee pain, increase knee ROM, increase LE strength, and improve walking, steps, sit-stands, in/out of car, and on/off toilet. STG: (to be met in 10 treatments)  1. ? Pain: R knee average 6/10, 8/10 at worst; L knee average 4/10, 7/10 at worst-Min Progress Toward  2. ? ROM: R knee 10-96°, L knee 5-116°-Progress toward ext B, no change Flex B   3. ? Strength: B knees 4/5, B hips 4-/5, R DF 4-/5 -Met DF, Progress toward L hip,knee   4. ? Function: LEFS 66% loss of function-No Change    5. Pt demonstrate improved gait pattern-No Change  6.  Patient to be independent with home exercise program as demonstrated by performance with correct form without cues-Met for current HEP     LTG: (to be met in 20 treatments)  1. ? Pain: R knee average 4/10, 6/10 at worst; L knee average 2/10, 5/10 at worst  2. ? ROM: R knee 6-106°  3. ? Strength: B knees 4+/5, B hips 4/5, R DF 4/5  4. ? Function: LEFS 40% loss of function   5. Pt reports improved sit-stands, in/out of car, and steps                    Patient goals: \"To be able to move freely with little or no pain, bend my knees and relief of pain. \"    Pt. Education:  [x] Yes  [] No  [x] Reviewed Prior HEP/Ed  Method of Education: [x] Verbal  [x] Demo  [] Written  Comprehension of Education:  [x] Verbalizes understanding. [] Demonstrates understanding. [x] Needs review. [] Demonstrates/verbalizes HEP/Ed previously given. 6/30/21: HEP: Access Code: [email or web address]  URL: [email or web address]  Supine Quad Set - 3 x daily - 7 x weekly - 10 reps - 5 seconds hold  Supine Hip Adduction Isometric with Ball - 3 x daily - 7 x weekly - 10 reps - 5 sec hold  Supine Short Arc Quad - 3 x daily - 7 x weekly - 20 reps  Supine Active Straight Leg Raise - 3 x daily - 7 x weekly - 20 reps    8/16-HEP-Access Code: [email or web address]  URL: [email or web address]  Heel rises with counter support - 2-3 x daily - 7 x weekly - 20 reps  Standing Knee Flexion - 1-2 x daily - 7 x weekly - 20 reps  Standing Hip Abduction - 1-2 x daily - 7 x weekly - 20 reps  Standing Hip Extension with Counter Support - 1-2 x daily - 7 x weekly - 20 reps         Plan: [x] Continue current frequency toward long and short term goals.    [x] Frequency:  2 x/week for 20 visits    [x] Specific Instructions for subsequent treatments: resume prior ex per Pt tolerance; consider US; cont until Pt sees  8/20, await further instructions       Time In:  0910            Time Out: 1017      Electronically signed by:  Иван Morales PT

## 2021-08-20 ENCOUNTER — HOSPITAL ENCOUNTER (OUTPATIENT)
Dept: PHYSICAL THERAPY | Age: 51
Setting detail: THERAPIES SERIES
Discharge: HOME | End: 2021-08-20
Payer: COMMERCIAL

## 2021-08-20 ENCOUNTER — OFFICE VISIT (OUTPATIENT)
Dept: ORTHOPEDIC SURGERY | Age: 51
End: 2021-08-20
Payer: COMMERCIAL

## 2021-08-20 VITALS — BODY MASS INDEX: 47.09 KG/M2 | WEIGHT: 293 LBS | HEIGHT: 66 IN

## 2021-08-20 DIAGNOSIS — M17.0 ARTHRITIS OF BOTH KNEES: Primary | ICD-10-CM

## 2021-08-20 PROCEDURE — 97140 MANUAL THERAPY 1/> REGIONS: CPT

## 2021-08-20 PROCEDURE — 99214 OFFICE O/P EST MOD 30 MIN: CPT | Performed by: ORTHOPAEDIC SURGERY

## 2021-08-20 PROCEDURE — 97110 THERAPEUTIC EXERCISES: CPT

## 2021-08-20 ASSESSMENT — ENCOUNTER SYMPTOMS
COUGH: 0
ABDOMINAL PAIN: 0
APNEA: 0
CHEST TIGHTNESS: 0
VOMITING: 0

## 2021-08-20 NOTE — FLOWSHEET NOTE
[x] [facility] [place] [place] &  Therapy  [address]  [phone]  F: [phone]     Physical Therapy Daily Treatment Note    Date:  2021  Patient Name:  Georgie Muhammad      :  1970    MRN: [id number]  Physician: Sarah Pearce                   Insurance: [employer] Reyna (limit 60 Rx)  Medical Diagnosis: Arthritis B knees M17.0                                    Rehab Codes: M25.561, M25.562, M25.661, M25.662, R26.2, R26.89, M62.551, M62.552  Onset date: 2021                      Next 's appt.: 2021    Visit# / total visits:    Cancels/No Shows: 0/1    Subjective:    Pain:  [x] Yes  [] No Location: B knees   Pain Rating: (0-10 scale) 8/10 R, 6/10 L   Pain altered Tx:  [] No  [x] Yes  Action: Decreased resistance, limited ex  Comments:  Pt reports knees are always the same. Objective:  Modalities: Game Ready to B knees at end of Tx, 38° mod compression with wedge post exercises.  -- Held  HP (large) B knees, at end of Rx, in supine w/wedge-added 8/3  Precautions:  Exercises completed marked with \"X\" 21  Exercise   Jason knees Reps/ Time Weight/ Level Comments    SciFit 5 min Level 3  x          Stand at bars    Held all weights     Calf stretch  3x 20sec On slant board, progressed hold time  x   HR  20x   x   HS curls 15x   x   Hip abd  15x   x   Hip ext 10x   x   Hip flexion/SLR 10x      TKE  blue     Step Ups  --- 6\" Lateral,fwd           Total gym squats  L 35 Verbal cues to keep knees from valgus during squat                            Seated       HS stretch on Stool 3x20\" ea   x   LAQ 15x3\"  W/ ball x   Hamstring curl  lime     Heel/toe raises  20x   x   Heel slides  15x  w/pillowcase under foot x          Supine   Hold all weights     Quadriceps Isometric 15x5\" ea   x   Hip Adduction Isometric 15x5\"    x   Heel Slides 15x ea  Orange slide tube x   SLR       SLR w/ ER       Bridge w/ball   Decreased reps 8/3    SAQ w/ball 15x  L harder to bend, but less pain x   Hooklying Clamshell 2x10 Blueberry   x   Manual to distal quad 10 min  Distal quad MFR, hypervolt to quads bilaterally                   Prone       Hip flexor stretch    Strap                                                     Other: * Held ankle weights for all exercise this date due to increased pain. 8/13 ROM DEGREES A/P ROM DEGREES A/P STRENGTH  STRENGTH     LEFT  RIGHT LEFT RIGHT   HIP FLEX   4- 3+p          KNEE FLEX 102°p 73°p     KNEE EXT 4° 13° 4-p 4-p          ANKLE DF   4 4              Treatment Charges: Mins Units   [x]  Modalities HP 15 --   [x]  Ther Exercise 33 2   [x]  Manual Therapy 10 1   []  Ther Activities     []  Aquatics     []  Vasocompression     []  Other     Total Treatment time 43 min        Assessment: [x] Progressing toward goals: Pt able to tolerate increased hold time calf stretch. Cont manual and HP after ex to decrease pain and tightness. [x] No change. Cont w/limited ex due to pain, cont to hold all weights. Pt to see Dr after Rx today, await further instructions. [] Other:   [x] Patient would benefit from skilled physical therapy services in order to: decrease knee pain, increase knee ROM, increase LE strength, and improve walking, steps, sit-stands, in/out of car, and on/off toilet. STG: (to be met in 10 treatments)  1. ? Pain: R knee average 6/10, 8/10 at worst; L knee average 4/10, 7/10 at worst-Min Progress Toward  2. ? ROM: R knee 10-96°, L knee 5-116°-Progress toward ext B, no change Flex B   3. ? Strength: B knees 4/5, B hips 4-/5, R DF 4-/5 -Met DF, Progress toward L hip,knee   4. ? Function: LEFS 66% loss of function-No Change    5. Pt demonstrate improved gait pattern-No Change  6.  Patient to be independent with home exercise program as demonstrated by performance with correct form without cues-Met for current HEP     LTG: (to be met in 20 treatments)  1. ? Pain: R knee average 4/10, 6/10 at worst; L knee average

## 2021-08-20 NOTE — PROGRESS NOTES
[facility]  [facility]  [address] [facility] [address]  Dept: [phone]  Dept Fax: [phone]        Ambulatory Follow Up      Subjective:   Bib Palacios is a 54y.o. year old female who presents to our office today for routine followup regarding her   1. Arthritis of both knees    2. BMI 45.0-49.9, adult ([address]    . Chief Complaint   Patient presents with    Follow-up     bilateral knee pain        HPI- Bib Palacios  is a 54 y.o. female who presents today in follow for bilateral knee arthritis. The patient was last seen on 6/21/2021 and underwent treatment in the form of mobic, weight loss consultation, and lowering her A1c in order to make it safe for a total knee arthroplasty. The patient notes 0% improvement with the previous treatment. The patient feels like the mobic does not help and she has been going to therapy and seeing little improvement. The patient states that she did go to get a weight loss consult but her insurance does not cover it. She has been working on weight loss on her own. Her last BMI on 6/21/21 was 48.58 and today her BMI was 47.61. The patient has not yet had her A1c checked by her PCP. Review of Systems   Constitutional: Negative for chills and fever. Respiratory: Negative for apnea, cough and chest tightness. Cardiovascular: Negative for chest pain and palpitations. Gastrointestinal: Negative for abdominal pain and vomiting. Genitourinary: Negative for difficulty urinating. Musculoskeletal: Positive for arthralgias (bilateral knees). Negative for gait problem, joint swelling and myalgias. Neurological: Negative for dizziness, weakness and numbness. I have reviewed the CC, HPI, ROS, PMH, FHX, Social History, and if not present in this note, I have reviewed in the patient's chart.    I agree with the documentation provided by other staff and have reviewed their documentation prior to providing my plan.    Radiology:     No results found. Assessment:      1. Arthritis of both knees    2. BMI 45.0-49.9, adult [facility])       Plan:      Discussed with the patient that she should continue with her current treatment. Discussed that a total knee arthroplasty is her best option for treatment at this time. She is on the right track at this time to a healthier lifestyle. As for the patient's pain I do feel that a pain management specialist would best benefit her until she can be cleared for surgery. The patient voices agreement. The patient to follow up in the office as needed. Follow up:Return if symptoms worsen or fail to improve. Orders Placed This Encounter   Medications    Misc. Devices MISC     Sig: Single point adjustable cane     Dispense:  1 Device     Refill:  0         Orders Placed This Encounter   Procedures   Krystle Lloyd MD, Pain Management, [place]     Referral Priority:   Routine     Referral Type:   Eval and Treat     Referral Reason:   Specialty Services Required     Referred to Provider:   Varun Stewart MD     Requested Specialty:   Pain Management     Number of Visits Requested:   1     I, Radha Raphael LPN am scribing for and in the presence of Dr. Yg Richmond  8/21/2021 3:52 PM      I have reviewed and made changes accordingly to the work scribed by Radha Raphael LPN. The documentation accurately reflects work and decisions made by me. I have also reviewed documentation completed by clinical staff.     Yg Richmond DO, [facility]  8/21/2021 3:53 PM      This note is created with the assistance of a speech recognition program.  While intending to generate a document that actually reflects the content of the visit, the document can still have some errors including those of syntax and sound a like substitutions which may escape proof reading.  In such instances, actual meaning can be extrapolated by contextual diversion      Electronically signed by Hillary Hastings DO, FAOAO on 8/21/2021 at 3:52 PM

## 2021-09-27 ENCOUNTER — INITIAL CONSULT (OUTPATIENT)
Dept: PAIN MANAGEMENT | Age: 51
End: 2021-09-27
Payer: COMMERCIAL

## 2021-09-27 VITALS
DIASTOLIC BLOOD PRESSURE: 108 MMHG | SYSTOLIC BLOOD PRESSURE: 177 MMHG | HEIGHT: 66 IN | WEIGHT: 285 LBS | BODY MASS INDEX: 45.8 KG/M2

## 2021-09-27 DIAGNOSIS — G89.29 CHRONIC PAIN OF BOTH KNEES: Primary | ICD-10-CM

## 2021-09-27 DIAGNOSIS — M17.0 BILATERAL PRIMARY OSTEOARTHRITIS OF KNEE: ICD-10-CM

## 2021-09-27 DIAGNOSIS — M25.562 CHRONIC PAIN OF BOTH KNEES: Primary | ICD-10-CM

## 2021-09-27 DIAGNOSIS — M25.561 CHRONIC PAIN OF BOTH KNEES: Primary | ICD-10-CM

## 2021-09-27 PROCEDURE — 99204 OFFICE O/P NEW MOD 45 MIN: CPT | Performed by: PAIN MEDICINE

## 2021-09-27 ASSESSMENT — ENCOUNTER SYMPTOMS
BOWEL INCONTINENCE: 0
COUGH: 0

## 2021-09-27 NOTE — PROGRESS NOTES
HPI:     Knee Pain   The incident occurred more than 1 week ago. Injury mechanism: arthritis. The pain is present in the right knee and left knee. The pain is at a severity of 8/10. The pain has been constant since onset. Associated symptoms include an inability to bear weight and muscle weakness. Treatments tried: steroid treatments, injections (right)     Chronic bilateral knee pain. X-ray of the knees with advanced degenerative changes. She has seen orthopedic surgeon, not currently a candidate for knee replacements. Patient denies any new neurological symptoms. No bowel or bladder incontinence, no weakness, and no falling. Review of OARRS does not show any aberrant prescription behavior. Past Medical History:   Diagnosis Date    Anemia     with first pregnancy    Diabetes mellitus ([facility] [address] 2007    NIDDM    Hypertension        Past Surgical History:   Procedure Laterality Date    DENTAL SURGERY  2011    dentures    DILATION AND CURETTAGE         No Known Allergies      Current Outpatient Medications:     Misc. Devices MISC, Single point adjustable cane, Disp: 1 Device, Rfl: 0    meloxicam (MOBIC) 15 MG tablet, Take 1 tablet by mouth daily, Disp: 30 tablet, Rfl: 3    metoprolol (TOPROL XL) 50 MG XL tablet, Take 50 mg by mouth 2 times daily. , Disp: , Rfl:     MetFORMIN HCl (GLUCOPHAGE PO), Take 500 mg by mouth 2 times daily , Disp: , Rfl:     Family History   Problem Relation Age of Onset    Hypertension Paternal Grandmother     Diabetes Paternal Grandmother     Coronary Art Dis Paternal Grandmother     Alzheimer's Disease Paternal Grandmother     Cancer Maternal Grandmother         LUNG    Cancer Maternal Grandfather     Hypertension Father     Diabetes Father     Cancer Father 68        pancreatic    Diabetes Sister     Hypertension Paternal Aunt     Coronary Art Dis Paternal Aunt        Social History     Socioeconomic History    Marital status: Single     Spouse name: Not on file  Number of children: Not on file    Years of education: Not on file    Highest education level: Not on file   Occupational History    Not on file   Tobacco Use    Smoking status: Former Smoker    Smokeless tobacco: Never Used   Substance and Sexual Activity    Alcohol use: No     Comment: SOCIALLY    Drug use: No    Sexual activity: Yes     Partners: Male   Other Topics Concern    Not on file   Social History Narrative    Not on file     Social Determinants of Health     Financial Resource Strain:     Difficulty of Paying Living Expenses:    Food Insecurity:     Worried About Running Out of Food in the Last Year:     [address] in the Last Year:    Transportation Needs:     Lack of Transportation (Medical):  Lack of Transportation (Non-Medical):    Physical Activity:     Days of Exercise per Week:     Minutes of Exercise per Session:    Stress:     Feeling of Stress :    Social Connections:     Frequency of Communication with Friends and Family:     Frequency of Social Gatherings with Friends and Family:     Attends Rastafari Services:     Active Member of Clubs or Organizations:     Attends Club or Organization Meetings:     Marital Status:    Intimate Partner Violence:     Fear of Current or Ex-Partner:     Emotionally Abused:     Physically Abused:     Sexually Abused:        Review of Systems:  Review of Systems   Constitutional: Negative for fever. Cardiovascular: Negative for chest pain. Respiratory: Negative for cough. Gastrointestinal: Negative for bowel incontinence. Physical Exam:  BP (!) 177/108   Ht 5' 6\" (1.676 m)   Wt 285 lb (129.3 kg)   BMI 46.00 kg/m²     Physical Exam  Constitutional:       Appearance: Normal appearance. Pulmonary:      Effort: Pulmonary effort is normal.   Neurological:      Mental Status: She is alert.    Psychiatric:         Attention and Perception: Attention and perception normal.         Mood and Affect: Mood and affect normal.         Record/Diagnostics Review:    As above, I did independently review the imaging      Assessment:  1. Chronic pain of both knees    2. Bilateral primary osteoarthritis of knee        Treatment Plan:  DISCUSSION: Treatment options discussed with patient and all questions answered to patient's satisfaction. OARRS Review: Reviewed and acceptable for medications prescribed. TREATMENT OPTIONS:     Discussed different treatment options including continued conservative care such as physical therapy, chiropractic care, acupuncture. Discussed different interventional options  Also discussed surgical evaluation. Continues to have significant knee pain despite multiple treatment modalities, has seen a surgeon, not surgical candidate at this point, may benefit from diagnostic genicular nerve block ×2 and if beneficial would be candidate for genicular radiofrequency ablation. Roz Peacock M.D. I have reviewed the chief complaint and history of present illness (including ROS and PFSH) and vital documentation by my staff and I agree with their documentation and have added where applicable.

## 2021-10-06 RX ORDER — METRONIDAZOLE 500 MG/1
500 TABLET ORAL 2 TIMES DAILY
Qty: 20 TABLET | Refills: 0 | Status: SHIPPED | OUTPATIENT
Start: 2021-10-06 | End: 2021-10-16

## 2021-10-19 ENCOUNTER — TELEPHONE (OUTPATIENT)
Dept: ADMINISTRATIVE | Age: 51
End: 2021-10-19

## 2021-10-21 ENCOUNTER — HOSPITAL ENCOUNTER (OUTPATIENT)
Dept: PAIN MANAGEMENT | Facility: CLINIC | Age: 51
Discharge: HOME | End: 2021-10-21
Payer: COMMERCIAL

## 2021-10-21 VITALS
RESPIRATION RATE: 9 BRPM | DIASTOLIC BLOOD PRESSURE: 117 MMHG | HEART RATE: 65 BPM | SYSTOLIC BLOOD PRESSURE: 146 MMHG | OXYGEN SATURATION: 99 %

## 2021-10-21 VITALS
DIASTOLIC BLOOD PRESSURE: 95 MMHG | BODY MASS INDEX: 45.48 KG/M2 | TEMPERATURE: 97.6 F | OXYGEN SATURATION: 100 % | SYSTOLIC BLOOD PRESSURE: 187 MMHG | RESPIRATION RATE: 14 BRPM | HEART RATE: 68 BPM | WEIGHT: 283 LBS | HEIGHT: 66 IN

## 2021-10-21 DIAGNOSIS — R52 PAIN MANAGEMENT: ICD-10-CM

## 2021-10-21 LAB
GLUCOSE BLD-MCNC: 181 MG/DL (ref 65–105)
HCG, PREGNANCY URINE (POC): NEGATIVE

## 2021-10-21 PROCEDURE — 20610 DRAIN/INJ JOINT/BURSA W/O US: CPT

## 2021-10-21 PROCEDURE — 64454 NJX AA&/STRD GNCLR NRV BRNCH: CPT | Performed by: PAIN MEDICINE

## 2021-10-21 PROCEDURE — 2500000003 HC RX 250 WO HCPCS: Performed by: PAIN MEDICINE

## 2021-10-21 PROCEDURE — 2580000003 HC RX 258: Performed by: PAIN MEDICINE

## 2021-10-21 PROCEDURE — 77002 NEEDLE LOCALIZATION BY XRAY: CPT

## 2021-10-21 PROCEDURE — 6360000002 HC RX W HCPCS: Performed by: PAIN MEDICINE

## 2021-10-21 PROCEDURE — 82947 ASSAY GLUCOSE BLOOD QUANT: CPT

## 2021-10-21 PROCEDURE — 81025 URINE PREGNANCY TEST: CPT

## 2021-10-21 RX ORDER — MIDAZOLAM HYDROCHLORIDE 2 MG/2ML
INJECTION, SOLUTION INTRAMUSCULAR; INTRAVENOUS
Status: COMPLETED | OUTPATIENT
Start: 2021-10-21 | End: 2021-10-21

## 2021-10-21 RX ORDER — FENTANYL CITRATE 50 UG/ML
INJECTION, SOLUTION INTRAMUSCULAR; INTRAVENOUS
Status: COMPLETED | OUTPATIENT
Start: 2021-10-21 | End: 2021-10-21

## 2021-10-21 RX ORDER — BUPIVACAINE HYDROCHLORIDE 2.5 MG/ML
INJECTION, SOLUTION EPIDURAL; INFILTRATION; INTRACAUDAL
Status: COMPLETED | OUTPATIENT
Start: 2021-10-21 | End: 2021-10-21

## 2021-10-21 RX ORDER — LIDOCAINE HYDROCHLORIDE 10 MG/ML
INJECTION, SOLUTION EPIDURAL; INFILTRATION; INTRACAUDAL; PERINEURAL
Status: COMPLETED | OUTPATIENT
Start: 2021-10-21 | End: 2021-10-21

## 2021-10-21 RX ADMIN — FENTANYL CITRATE 100 MCG: 50 INJECTION INTRAMUSCULAR; INTRAVENOUS at 10:07

## 2021-10-21 RX ADMIN — MIDAZOLAM HYDROCHLORIDE 2 MG: 1 INJECTION, SOLUTION INTRAMUSCULAR; INTRAVENOUS at 10:07

## 2021-10-21 RX ADMIN — LIDOCAINE HYDROCHLORIDE 6 ML: 10 INJECTION, SOLUTION EPIDURAL; INFILTRATION; INTRACAUDAL at 10:13

## 2021-10-21 RX ADMIN — WATER 6 ML: 1 INJECTION INTRAMUSCULAR; INTRAVENOUS; SUBCUTANEOUS at 10:14

## 2021-10-21 RX ADMIN — BUPIVACAINE HYDROCHLORIDE 5 ML: 2.5 INJECTION, SOLUTION EPIDURAL; INFILTRATION; INTRACAUDAL; PERINEURAL at 10:15

## 2021-10-21 ASSESSMENT — PAIN DESCRIPTION - DESCRIPTORS: DESCRIPTORS: NAGGING;SHARP

## 2021-10-21 ASSESSMENT — PAIN DESCRIPTION - PAIN TYPE: TYPE: CHRONIC PAIN

## 2021-10-21 ASSESSMENT — PAIN DESCRIPTION - PROGRESSION: CLINICAL_PROGRESSION: GRADUALLY WORSENING

## 2021-10-21 ASSESSMENT — PAIN DESCRIPTION - ONSET: ONSET: PROGRESSIVE

## 2021-10-21 ASSESSMENT — PAIN - FUNCTIONAL ASSESSMENT
PAIN_FUNCTIONAL_ASSESSMENT: PREVENTS OR INTERFERES SOME ACTIVE ACTIVITIES AND ADLS
PAIN_FUNCTIONAL_ASSESSMENT: 0-10

## 2021-10-21 ASSESSMENT — PAIN DESCRIPTION - LOCATION: LOCATION: KNEE

## 2021-10-21 ASSESSMENT — PAIN SCALES - GENERAL: PAINLEVEL_OUTOF10: 8

## 2021-10-21 ASSESSMENT — PAIN DESCRIPTION - FREQUENCY: FREQUENCY: CONTINUOUS

## 2021-10-21 ASSESSMENT — PAIN DESCRIPTION - ORIENTATION: ORIENTATION: RIGHT;LEFT

## 2021-10-21 NOTE — H&P
Pain Pre-Op H&P Note    Salvador Oliveira MD    HPI: Sary Avila  presents with knee pain. Past Medical History:   Diagnosis Date    Anemia     with first pregnancy    Diabetes mellitus ([address] 2007    NIDDM    Hypertension        Past Surgical History:   Procedure Laterality Date    DENTAL SURGERY  2011    dentures    DILATION AND CURETTAGE         Family History   Problem Relation Age of Onset    Hypertension Paternal Grandmother     Diabetes Paternal Grandmother     Coronary Art Dis Paternal Grandmother     Alzheimer's Disease Paternal Grandmother     Cancer Maternal Grandmother         LUNG    Cancer Maternal Grandfather     Hypertension Father     Diabetes Father     Cancer Father 68        pancreatic    Diabetes Sister     Hypertension Paternal Aunt     Coronary Art Dis Paternal Aunt        No Known Allergies      Current Outpatient Medications:     metoprolol (TOPROL XL) 50 MG XL tablet, Take 50 mg by mouth 2 times daily. , Disp: , Rfl:     MetFORMIN HCl (GLUCOPHAGE PO), Take 500 mg by mouth 2 times daily , Disp: , Rfl:     Misc. Devices MISC, Single point adjustable cane, Disp: 1 Device, Rfl: 0    Social History     Tobacco Use    Smoking status: Former Smoker    Smokeless tobacco: Never Used   Substance Use Topics    Alcohol use: No     Comment: SOCIALLY       Review of Systems:   Focused review of systems was performed, and negative as pertinent to diagnosis, except as stated in HPI. Physical Exam  Constitutional:       Appearance: Normal appearance. Pulmonary:      Effort: Pulmonary effort is normal.   Neurological:      Mental Status: He is alert.    Psychiatric:         Attention and Perception: Attention and perception normal.         Mood and Affect: Mood and affect normal.         Patient's current physical status, medications, medical history, and HPI have been reviewed and updated as appropriate on this date: 10/21/21    Risk/Benefit(s): The risks, benefits, alternatives, and potential complications have been discussed with the patient/family and informed consent has been obtained for the procedure/sedation. Diagnosis:   Knee pain.       Plan: genicular block        Celio Moncada MD

## 2021-10-21 NOTE — OP NOTE
Genicular Nerve Block:  Surgeon: Luna Mcdaniels MD     PREOPERATIVE DIAGNOSES:   1. Bilateral knee pain. POSTOPERATIVE DIAGNOSES:   1. Same    PROCEDURE PERFORMED: Bilateral genicular nerve block at the medial and lateral femoral sites as well as the medial tibial sites. EBL: minimal    Patient has undergone the educational process with this procedure, is aware and fully understands the risks involved: potential damage to any and all body organs including possible bleeding, infection, and nerve injury, allergic reaction. Patient also understands that the procedure will be undertaken in a safe, controlled and monitored setting. patient recognizes that the benefits may include relief from pain and reduction in the oral use of medications. Patient agreed to proceed. The patient was counseled at length about the risks of sushant Covid-19 during their perioperative period and any recovery window from their procedure. The patient was made aware that sushant Covid-19  may worsen their prognosis for recovering from their procedure  and lend to a higher morbidity and/or mortality risk. All material risks, benefits, and reasonable alternatives including postponing the procedure were discussed. The patient does wish to proceed with the procedure at this time. DETAILS OF THE PROCEDURE: Danielle Helms is a 54 y.o. female with chronic bilateral knee pain who presents for genicular nerve block. The patient was seen in the preop holding area. Risks, benefits, and alternatives were discussed with the patient and informed consent was obtained. An IV was placed and the patient was taken and placed in the supine position on the fluoroscopy table. The bilateral knee was prepped and draped in the usual sterile fashion. Under fluoroscopic guidance, the areas overlying the genicular nerves at the medial and lateral femoral sites as well as the medial tibial site was anesthetized using 0.5% lidocaine. Afterwards a 25 g 3.5 cm spinal needle was advanced to the appropriate anatomic location under fluoro guidance. Afterwards, 1 ml of 0.25 % bupivacaine was injected into each site. The needle was removed. A bandade was placed and the patient tolerated the procedure well without complications and was taken to the recovery room in stable condition.       Robbie Naqvi MD

## 2021-11-08 ENCOUNTER — OFFICE VISIT (OUTPATIENT)
Dept: PAIN MANAGEMENT | Age: 51
End: 2021-11-08
Payer: COMMERCIAL

## 2021-11-08 VITALS
HEART RATE: 74 BPM | SYSTOLIC BLOOD PRESSURE: 173 MMHG | OXYGEN SATURATION: 98 % | WEIGHT: 283 LBS | DIASTOLIC BLOOD PRESSURE: 118 MMHG | BODY MASS INDEX: 45.48 KG/M2 | HEIGHT: 66 IN

## 2021-11-08 DIAGNOSIS — M25.562 CHRONIC PAIN OF BOTH KNEES: Primary | ICD-10-CM

## 2021-11-08 DIAGNOSIS — G89.29 CHRONIC PAIN OF BOTH KNEES: Primary | ICD-10-CM

## 2021-11-08 DIAGNOSIS — M17.0 BILATERAL PRIMARY OSTEOARTHRITIS OF KNEE: ICD-10-CM

## 2021-11-08 DIAGNOSIS — M25.561 CHRONIC PAIN OF BOTH KNEES: Primary | ICD-10-CM

## 2021-11-08 PROCEDURE — 99213 OFFICE O/P EST LOW 20 MIN: CPT | Performed by: PAIN MEDICINE

## 2021-11-08 NOTE — PROGRESS NOTES
HPI:     Knee Pain   The incident occurred more than 1 week ago. The pain is present in the right knee and left knee. The pain is at a severity of 5/10. The pain is moderate. The pain has been improving since onset. Pertinent negatives include no numbness or tingling. Treatments tried: injections, physical therapy. The treatment provided no relief. Bilateral knee pain. Failed multiple treatment options. Recent diagnostic genicular nerve block with 80% temporary benefit. Pain returned. Patient denies any new neurological symptoms. Nobowel or bladder incontinence, no weakness, and no falling. Review of OARRS does not show any aberrant prescription behavior. Past Medical History:   Diagnosis Date    Anemia     with first pregnancy    Diabetes mellitus [address] [address] 2007    NIDDM    Hypertension        Past Surgical History:   Procedure Laterality Date    DENTAL SURGERY  2011    dentures    DILATION AND CURETTAGE         No Known Allergies      Current Outpatient Medications:     Misc. Devices MISC, Single point adjustable cane, Disp: 1 Device, Rfl: 0    metoprolol (TOPROL XL) 50 MG XL tablet, Take 50 mg by mouth 2 times daily. , Disp: , Rfl:     MetFORMIN HCl (GLUCOPHAGE PO), Take 500 mg by mouth 2 times daily , Disp: , Rfl:     Family History   Problem Relation Age of Onset    Hypertension Paternal Grandmother     Diabetes Paternal Grandmother     Coronary Art Dis Paternal Grandmother     Alzheimer's Disease Paternal Grandmother     Cancer Maternal Grandmother         LUNG    Cancer Maternal Grandfather     Hypertension Father     Diabetes Father     Cancer Father 68        pancreatic    Diabetes Sister     Hypertension Paternal Aunt     Coronary Art Dis Paternal Aunt        Social History     Socioeconomic History    Marital status: Single     Spouse name: Not on file    Number of children: Not on file    Years of education: Not on file    Highest education level: Not on file Occupational History    Not on file   Tobacco Use    Smoking status: Former Smoker    Smokeless tobacco: Never Used   Substance and Sexual Activity    Alcohol use: No     Comment: SOCIALLY    Drug use: No    Sexual activity: Yes     Partners: Male   Other Topics Concern    Not on file   Social History Narrative    Not on file     Social Determinants of Health     Financial Resource Strain:     Difficulty of Paying Living Expenses: Not on file   Food Insecurity:     Worried About Running Out of Food in the Last Year: Not on file    Brooke of Food in the Last Year: Not on file   Transportation Needs:     Lack of Transportation (Medical): Not on file    Lack of Transportation (Non-Medical): Not on file   Physical Activity:     Days of Exercise per Week: Not on file    Minutes of Exercise per Session: Not on file   Stress:     Feeling of Stress : Not on file   Social Connections:     Frequency of Communication with Friends and Family: Not on file    Frequency of Social Gatherings with Friends and Family: Not on file    Attends Scientologist Services: Not on file    Active Member of [address] [place] [address] or Organizations: Not on file    Attends Club or Organization Meetings: Not on file    Marital Status: Not on file   Intimate Partner Violence:     Fear of Current or Ex-Partner: Not on file    Emotionally Abused: Not on file    Physically Abused: Not on file    Sexually Abused: Not on file   Housing Stability:     Unable to Pay for Housing in the Last Year: Not on file    Number of [place] in the Last Year: Not on file    Unstable Housing in the Last Year: Not on file       Review of Systems:  Review of Systems   Neurological: Negative for numbness and tingling. Physical Exam:  BP (!) 173/118   Pulse 74   Ht 5' 6\" (1.676 m)   Wt 283 lb (128.4 kg)   SpO2 98%   BMI 45.68 kg/m²     Physical Exam  Constitutional:       Appearance: Normal appearance.    Pulmonary:      Effort: Pulmonary effort is

## 2021-12-16 ENCOUNTER — HOSPITAL ENCOUNTER (OUTPATIENT)
Dept: PAIN MANAGEMENT | Facility: CLINIC | Age: 51
Discharge: HOME | End: 2021-12-16
Payer: COMMERCIAL

## 2021-12-16 VITALS
RESPIRATION RATE: 15 BRPM | HEIGHT: 66 IN | TEMPERATURE: 98.3 F | DIASTOLIC BLOOD PRESSURE: 95 MMHG | SYSTOLIC BLOOD PRESSURE: 161 MMHG | BODY MASS INDEX: 46.61 KG/M2 | OXYGEN SATURATION: 100 % | WEIGHT: 290 LBS | HEART RATE: 76 BPM

## 2021-12-16 DIAGNOSIS — R52 PAIN MANAGEMENT: ICD-10-CM

## 2021-12-16 LAB
GLUCOSE BLD-MCNC: 150 MG/DL (ref 65–105)
HCG, PREGNANCY URINE (POC): NEGATIVE

## 2021-12-16 PROCEDURE — 20610 DRAIN/INJ JOINT/BURSA W/O US: CPT

## 2021-12-16 PROCEDURE — 81025 URINE PREGNANCY TEST: CPT

## 2021-12-16 PROCEDURE — 2580000003 HC RX 258: Performed by: PAIN MEDICINE

## 2021-12-16 PROCEDURE — 82947 ASSAY GLUCOSE BLOOD QUANT: CPT

## 2021-12-16 PROCEDURE — 6360000002 HC RX W HCPCS: Performed by: PAIN MEDICINE

## 2021-12-16 PROCEDURE — 77002 NEEDLE LOCALIZATION BY XRAY: CPT

## 2021-12-16 PROCEDURE — 2500000003 HC RX 250 WO HCPCS: Performed by: PAIN MEDICINE

## 2021-12-16 PROCEDURE — 64454 NJX AA&/STRD GNCLR NRV BRNCH: CPT | Performed by: PAIN MEDICINE

## 2021-12-16 RX ORDER — LIDOCAINE HYDROCHLORIDE 10 MG/ML
INJECTION, SOLUTION EPIDURAL; INFILTRATION; INTRACAUDAL; PERINEURAL
Status: COMPLETED | OUTPATIENT
Start: 2021-12-16 | End: 2021-12-16

## 2021-12-16 RX ORDER — BUPIVACAINE HYDROCHLORIDE 2.5 MG/ML
INJECTION, SOLUTION EPIDURAL; INFILTRATION; INTRACAUDAL
Status: COMPLETED | OUTPATIENT
Start: 2021-12-16 | End: 2021-12-16

## 2021-12-16 RX ORDER — MIDAZOLAM HYDROCHLORIDE 2 MG/2ML
INJECTION, SOLUTION INTRAMUSCULAR; INTRAVENOUS
Status: COMPLETED | OUTPATIENT
Start: 2021-12-16 | End: 2021-12-16

## 2021-12-16 RX ADMIN — MIDAZOLAM HYDROCHLORIDE 2 MG: 1 INJECTION, SOLUTION INTRAMUSCULAR; INTRAVENOUS at 09:26

## 2021-12-16 RX ADMIN — BUPIVACAINE HYDROCHLORIDE 5 ML: 2.5 INJECTION, SOLUTION EPIDURAL; INFILTRATION; INTRACAUDAL; PERINEURAL at 09:30

## 2021-12-16 RX ADMIN — LIDOCAINE HYDROCHLORIDE 3 ML: 10 INJECTION, SOLUTION EPIDURAL; INFILTRATION; INTRACAUDAL at 09:28

## 2021-12-16 RX ADMIN — BUPIVACAINE HYDROCHLORIDE 5 ML: 2.5 INJECTION, SOLUTION EPIDURAL; INFILTRATION; INTRACAUDAL; PERINEURAL at 09:35

## 2021-12-16 RX ADMIN — WATER 3 ML: 1 INJECTION INTRAMUSCULAR; INTRAVENOUS; SUBCUTANEOUS at 09:28

## 2021-12-16 RX ADMIN — LIDOCAINE HYDROCHLORIDE 3 ML: 10 INJECTION, SOLUTION EPIDURAL; INFILTRATION; INTRACAUDAL at 09:32

## 2021-12-16 RX ADMIN — WATER 3 ML: 1 INJECTION INTRAMUSCULAR; INTRAVENOUS; SUBCUTANEOUS at 09:32

## 2021-12-16 ASSESSMENT — PAIN DESCRIPTION - DESCRIPTORS: DESCRIPTORS: ACHING;THROBBING

## 2021-12-16 ASSESSMENT — PAIN - FUNCTIONAL ASSESSMENT
PAIN_FUNCTIONAL_ASSESSMENT: PREVENTS OR INTERFERES SOME ACTIVE ACTIVITIES AND ADLS
PAIN_FUNCTIONAL_ASSESSMENT: 0-10
PAIN_FUNCTIONAL_ASSESSMENT: 0-10

## 2021-12-16 ASSESSMENT — PAIN SCALES - GENERAL
PAINLEVEL_OUTOF10: 2
PAINLEVEL_OUTOF10: 2

## 2021-12-16 NOTE — H&P
Pain Pre-Op H&P Note    Carol Galvan MD    HPI: Prakash Chatman  presents with back pain. Past Medical History:   Diagnosis Date    Anemia     with first pregnancy    Diabetes mellitus ([address] 2007    NIDDM    Hypertension        Past Surgical History:   Procedure Laterality Date    DENTAL SURGERY  2011    dentures    DILATION AND CURETTAGE         Family History   Problem Relation Age of Onset    Hypertension Paternal Grandmother     Diabetes Paternal Grandmother     Coronary Art Dis Paternal Grandmother     Alzheimer's Disease Paternal Grandmother     Cancer Maternal Grandmother         LUNG    Cancer Maternal Grandfather     Hypertension Father     Diabetes Father     Cancer Father 68        pancreatic    Diabetes Sister     Hypertension Paternal Aunt     Coronary Art Dis Paternal Aunt        No Known Allergies      Current Outpatient Medications:     metoprolol (TOPROL XL) 50 MG XL tablet, Take 50 mg by mouth 2 times daily. , Disp: , Rfl:     MetFORMIN HCl (GLUCOPHAGE PO), Take 500 mg by mouth 2 times daily , Disp: , Rfl:     Misc. Devices MISC, Single point adjustable cane, Disp: 1 Device, Rfl: 0    Social History     Tobacco Use    Smoking status: Former Smoker    Smokeless tobacco: Never Used   Substance Use Topics    Alcohol use: No     Comment: SOCIALLY       Review of Systems:   Focused review of systems was performed, and negative as pertinent to diagnosis, except as stated in HPI. Physical Exam  Constitutional:       Appearance: Normal appearance. Pulmonary:      Effort: Pulmonary effort is normal.   Neurological:      Mental Status: alert. Psychiatric:         Attention and Perception: Attention and perception normal.         Mood and Affect: Mood and affect normal.   Cardiovascular:      Rate: Normal rate.          ASA: 2          Mallampati: 2       Patient's current physical status, medications, medical history, and HPI have been reviewed and updated as appropriate on this date: 12/16/21    Risk/Benefit(s): The risks, benefits, alternatives, and potential complications have been discussed with the patient/family and informed consent has been obtained for the procedure/sedation.     Diagnosis:   Knee pain      Plan: genicular nerve block        Julian Ospina MD

## 2021-12-16 NOTE — OP NOTE
Genicular Nerve Block:  Surgeon: Maricruz Way MD     PREOPERATIVE DIAGNOSES:   1. Bilateral knee pain. POSTOPERATIVE DIAGNOSES:   1. Same    PROCEDURE PERFORMED: Genicular nerve block at the medial and lateral femoral sites as well as the medial tibial sites. EBL: minimal    Patient has undergone the educational process with this procedure, is aware and fully understands the risks involved: potential damage to any and all body organs including possible bleeding, infection, and nerve injury, allergic reaction. Patient also understands that the procedure will be undertaken in a safe, controlled and monitored setting. patient recognizes that the benefits may include relief from pain and reduction in the oral use of medications. Patient agreed to proceed. The patient was counseled at length about the risks of sushant Covid-19 during their perioperative period and any recovery window from their procedure. The patient was made aware that sushant Covid-19  may worsen their prognosis for recovering from their procedure  and lend to a higher morbidity and/or mortality risk. All material risks, benefits, and reasonable alternatives including postponing the procedure were discussed. The patient does wish to proceed with the procedure at this time. DETAILS OF THE PROCEDURE: Alisson Austin is a 54 y.o. female with chronic bilateral knee pain who presents for genicular nerve block. The patient was seen in the preop holding area. Risks, benefits, and alternatives were discussed with the patient and informed consent was obtained. An IV was placed and the patient was taken and placed in the supine position on the fluoroscopy table. The bilateral knee was prepped and draped in the usual sterile fashion. Under fluoroscopic guidance, the areas overlying the genicular nerves at the medial and lateral femoral sites as well as the medial tibial site was anesthetized using 0.5% lidocaine.    Afterwards a 25 g 3.5 cm spinal needle was advanced to the appropriate anatomic location under fluoro guidance. Afterwards, 1 ml of 0.25 % bupivacaine was injected into each site. The needle was removed. A bandade was placed and the patient tolerated the procedure well without complications and was taken to the recovery room in stable condition.       Salvador Oilveira MD

## 2021-12-29 ENCOUNTER — TELEPHONE (OUTPATIENT)
Dept: PAIN MANAGEMENT | Age: 51
End: 2021-12-29

## 2021-12-29 NOTE — TELEPHONE ENCOUNTER
Called pt to go over questions from her Genicular on 12/16/2021.     **Patient did not answer, msg #1 was left

## 2021-12-30 NOTE — TELEPHONE ENCOUNTER
Called pt to go over pain diay questions from Genicular on 12/16/2021. Pain before Genicular R - 8, L - 6  Pain after Genicular R - 4. L - 4  Patient stated she rested, did some walking and some housework. Patient reported 0% relief on the Right side and 80% relief on the Left side. OV was made.

## 2022-01-24 ENCOUNTER — VIRTUAL VISIT (OUTPATIENT)
Dept: PAIN MANAGEMENT | Age: 52
End: 2022-01-24
Payer: COMMERCIAL

## 2022-01-24 DIAGNOSIS — G89.29 CHRONIC PAIN OF BOTH KNEES: Primary | ICD-10-CM

## 2022-01-24 DIAGNOSIS — M25.562 CHRONIC PAIN OF BOTH KNEES: Primary | ICD-10-CM

## 2022-01-24 DIAGNOSIS — M25.561 CHRONIC PAIN OF BOTH KNEES: Primary | ICD-10-CM

## 2022-01-24 PROCEDURE — 99213 OFFICE O/P EST LOW 20 MIN: CPT | Performed by: PAIN MEDICINE

## 2022-01-24 ASSESSMENT — ENCOUNTER SYMPTOMS
COUGH: 0
BOWEL INCONTINENCE: 0

## 2022-01-24 NOTE — PROGRESS NOTES
HPI:     Knee Pain   The injury mechanism is unknown. The pain is present in the left knee and right knee. The quality of the pain is described as aching. The pain is moderate. Chronic bilateral knee pain. X-rays with advanced degenerative changes. Bilateral genicular nerve block x2. Greater than 80% temporary improvement x2 on the left. She has seen orthopedic surgeon not a surgical candidate at this point, needs to continue with weight loss    Patient denies any new neurological symptoms. Nobowel or bladder incontinence, no weakness, and no falling. Review of OARRS does not show any aberrant prescription behavior. Past Medical History:   Diagnosis Date    Anemia     with first pregnancy    Diabetes mellitus ([facility] [address] 2007    NIDDM    Hypertension        Past Surgical History:   Procedure Laterality Date    DENTAL SURGERY  2011    dentures    DILATION AND CURETTAGE      PAIN MANAGEMENT PROCEDURE Bilateral 10/2021    bilateral knee injection        No Known Allergies      Current Outpatient Medications:     Misc. Devices MISC, Single point adjustable cane, Disp: 1 Device, Rfl: 0    metoprolol (TOPROL XL) 50 MG XL tablet, Take 50 mg by mouth 2 times daily. , Disp: , Rfl:     MetFORMIN HCl (GLUCOPHAGE PO), Take 500 mg by mouth 2 times daily , Disp: , Rfl:     Family History   Problem Relation Age of Onset    Hypertension Paternal Grandmother     Diabetes Paternal Grandmother     Coronary Art Dis Paternal Grandmother     Alzheimer's Disease Paternal Grandmother     Cancer Maternal Grandmother         LUNG    Cancer Maternal Grandfather     Hypertension Father     Diabetes Father     Cancer Father 68        pancreatic    Diabetes Sister     Hypertension Paternal Aunt     Coronary Art Dis Paternal Aunt        Social History     Socioeconomic History    Marital status: Single     Spouse name: Not on file    Number of children: Not on file    Years of education: Not on file    Highest education level: Not on file   Occupational History    Not on file   Tobacco Use    Smoking status: Former Smoker    Smokeless tobacco: Never Used   Substance and Sexual Activity    Alcohol use: No     Comment: SOCIALLY    Drug use: No    Sexual activity: Yes     Partners: Male   Other Topics Concern    Not on file   Social History Narrative    Not on file     Social Determinants of Health     Financial Resource Strain:     Difficulty of Paying Living Expenses: Not on file   Food Insecurity:     Worried About Running Out of Food in the Last Year: Not on file    Brooke of Food in the Last Year: Not on file   Transportation Needs:     Lack of Transportation (Medical): Not on file    Lack of Transportation (Non-Medical): Not on file   Physical Activity:     Days of Exercise per Week: Not on file    Minutes of Exercise per Session: Not on file   Stress:     Feeling of Stress : Not on file   Social Connections:     Frequency of Communication with Friends and Family: Not on file    Frequency of Social Gatherings with Friends and Family: Not on file    Attends Jewish Services: Not on file    Active Member of [address] [place] [address] or Organizations: Not on file    Attends Club or Organization Meetings: Not on file    Marital Status: Not on file   Intimate Partner Violence:     Fear of Current or Ex-Partner: Not on file    Emotionally Abused: Not on file    Physically Abused: Not on file    Sexually Abused: Not on file   Housing Stability:     Unable to Pay for Housing in the Last Year: Not on file    Number of [place] in the Last Year: Not on file    Unstable Housing in the Last Year: Not on file       Review of Systems:  Review of Systems   Constitutional: Negative for fever. Cardiovascular: Negative for chest pain. Respiratory: Negative for cough. Gastrointestinal: Negative for bowel incontinence. Physical Exam:      Physical Exam  Constitutional:       Appearance: Normal appearance. Pulmonary:      Effort: Pulmonary effort is normal.   Neurological:      Mental Status: She is alert. Psychiatric:         Attention and Perception: Attention and perception normal.         Mood and Affect: Mood and affect normal.         Record/Diagnostics Review:    As above, I did review the imaging      Assessment:  1. Chronic pain of both knees        Treatment Plan:  DISCUSSION: Treatment options discussed with patient and all questions answered to patient's satisfaction. OARRS Review: Reviewed and acceptable for medications prescribed. TREATMENT OPTIONS:     Discussed different treatment options including continued conservative care such as physical therapy, chiropractic care, acupuncture. Discussed different interventional options such as epidural steroids or medial branch blocks. Also discussed neuromodulation in the form of spinal cord stimulation. Also discussed surgical evaluation. Greater than 80% temporary benefit x2 with diagnostic genicular nerve block on the left, will go to genicular RFA for attempted longer-term benefit. In regards to the right knee, consider repeat intra-articular steroid injections. Continues to work on weight loss towards the ultimate goal of knee replacement. Ray Santiago M.D. I have reviewed the chief complaint and history of present illness (including ROS and PFSH) and vital documentation by my staff and I agree with their documentation and have added where applicable. Magdalena Gipson, was evaluated through a synchronous (real-time) audio-video encounter. The patient (or guardian if applicable) is aware that this is a billable service, which includes applicable co-pays. This Virtual Visit was conducted with patient's (and/or legal guardian's) consent.  The visit was conducted pursuant to the emergency declaration under the [address] [facility] [place], [address] waiver authority and the [place] [employer] and Response Supplemental Appropriations Act. Patient identification was verified, and a caregiver was present when appropriate. The patient was located at home in a state where the provider was licensed to provide care. Total time spent for this encounter: Not billed by time    --Frankie Adair MD on 1/24/2022 at 9:54 AM    An electronic signature was used to authenticate this note.

## 2023-10-03 ENCOUNTER — OFFICE VISIT (OUTPATIENT)
Dept: OBGYN CLINIC | Age: 53
End: 2023-10-03

## 2023-10-03 ENCOUNTER — HOSPITAL ENCOUNTER (OUTPATIENT)
Age: 53
Setting detail: SPECIMEN
Discharge: HOME | End: 2023-10-03

## 2023-10-03 VITALS — BODY MASS INDEX: 47.45 KG/M2 | WEIGHT: 293 LBS | DIASTOLIC BLOOD PRESSURE: 80 MMHG | SYSTOLIC BLOOD PRESSURE: 124 MMHG

## 2023-10-03 DIAGNOSIS — N93.9 ABNORMAL UTERINE BLEEDING (AUB): Primary | ICD-10-CM

## 2023-10-03 DIAGNOSIS — Z12.31 VISIT FOR SCREENING MAMMOGRAM: ICD-10-CM

## 2023-10-03 NOTE — PATIENT INSTRUCTIONS
We will contact you with the results of today's biopsy. We will also schedule a pelvic ultrasound. If you would like it done in the office, please schedule at the checkout counter today or we can have it done at a [facility]..  Once your ultrasound is reviewed we will contact you with recommendations for follow-up. Plan on returning to the office for an annual exam at your convenience.

## 2023-10-03 NOTE — PROGRESS NOTES
[facility] [place]  [address] [address]     10/3/23    Chief Complaint   Patient presents with    Established New Doctor     Here for abnormal l bleeding bleed for 2 months heavy bleeding with clots July and august and [address] is a  5 para . She has been lost to follow-up for 3 years. She presents today stating that she was having cycles every 30 days lasting 5 days. She has HMB the first 1-2 days. She denied any intermenstrual bleeding until this past August when she had normal onset of menses but continued to have daily bleeding. Bleeding varied in amount and was associated with moderately severe cramping. In September she had some episodes of HMB again associated with moderately severe cramping. She denies any vaginal bleeding so far this month. She is here today for EMB secondary to     ICD-10-CM    1. Abnormal uterine bleeding (AUB)  N93.9       2. Visit for screening mammogram  Z12.31       . Discussed with patient today abnormal bleeding and management of it. EMB was offered in the office and we did discuss the possibility of hysteroscopy/D&C. Patient agrees to proceed. Physical exam  Vitals:    10/03/23 1019   BP: 124/80       No vulvar or vaginal or cervical lesions noted. Normal vaginal discharge present. Cervix is parous. Under a sepsis the anterior lip of the cervix was grasped with a single-tooth tenaculum. The Pipelle could not be passed into the cervical canal as it felt like it was being partially blocked? The cervix was atraumatically dilated and the Pipelle was passed. EMB performed uneventfully. Uterine cavity only measured 4-5 cm and may not have been in the uterine cavity? Several passes were made and a scant amount of specimen obtained and sent for pathology. Patient tolerated procedure well and left in good condition.   She'll be

## 2023-10-05 LAB — SURGICAL PATHOLOGY REPORT: NORMAL

## 2023-10-19 ENCOUNTER — TELEPHONE (OUTPATIENT)
Dept: OBGYN CLINIC | Age: 53
End: 2023-10-19

## 2023-10-19 NOTE — TELEPHONE ENCOUNTER
10/19/2023:  2:15 PM:  Spoke with Aide Mason on the phone today regarding EMB and ultrasound results. Explained scant amount of specimen but nothing precancerous or cancerous noted. Ultrasound with multiple fibroids. Currently she is only having sporadic episodes of HMB and desires expectant management for now and will schedule an appointment in the office if symptoms become more severe. Anthony Su MD

## 2024-04-11 ENCOUNTER — INITIAL CONSULT (OUTPATIENT)
Dept: OBGYN CLINIC | Age: 54
End: 2024-04-11
Payer: COMMERCIAL

## 2024-04-11 VITALS — WEIGHT: 278 LBS | DIASTOLIC BLOOD PRESSURE: 82 MMHG | BODY MASS INDEX: 44.87 KG/M2 | SYSTOLIC BLOOD PRESSURE: 124 MMHG

## 2024-04-11 DIAGNOSIS — D25.0 SUBMUCOUS LEIOMYOMA OF UTERUS: Primary | ICD-10-CM

## 2024-04-11 DIAGNOSIS — E66.01 OBESITIES, MORBID (HCC): ICD-10-CM

## 2024-04-11 DIAGNOSIS — N95.0 POSTMENOPAUSAL BLEEDING: ICD-10-CM

## 2024-04-11 PROCEDURE — 3079F DIAST BP 80-89 MM HG: CPT | Performed by: OBSTETRICS & GYNECOLOGY

## 2024-04-11 PROCEDURE — 99214 OFFICE O/P EST MOD 30 MIN: CPT | Performed by: OBSTETRICS & GYNECOLOGY

## 2024-04-11 PROCEDURE — 3074F SYST BP LT 130 MM HG: CPT | Performed by: OBSTETRICS & GYNECOLOGY

## 2024-04-11 NOTE — PROGRESS NOTES
MetFORMIN HCl (GLUCOPHAGE PO) Take 500 mg by mouth 2 times daily        No current facility-administered medications for this visit.       Allergies:  No Known Allergies    Gynecologic History:  Patient's last menstrual period was 2018.  Sexually Active: Yes  STD History: Yes , CT  Abnormal Pap History ?  Birth Control: Yes , LTL  Family History of Breast, Ovarian, Colon or Uterine Cancer: no    OB History    Para Term  AB Living   5 2 2 0 2 2   SAB IAB Ectopic Molar Multiple Live Births   0 0 0 0 0 2     ______________________________________________________________________  REVIEW OF SYSTEMS:        Constitutional:  Unexpected weight change no  Neurological:  Frequent headaches  no  Ophthalmic:  Recent visual changes no  ENT:   Difficulty swallowing  no  Breast:              Masses   no     Respiratory:  Shortness of breath  no    Cardiovascular: Chest pain   no     Gastrointestinal: Chronic diarrhea/constipation no   Urogenital:  Urinary incontinence  no                                         Heavy/irregular periods           no                                      Vaginal discharge                   no  Hematological: Bruises easy   no     Endocrine:  Nipple Discharge  no     Hot/Cold Intolerance  no   Psychological:  Mood and affect were wnl yes                                                                                                                                           Physical Exam:    Vitals:    24 1356   BP: 124/82   Site: Right Upper Arm   Position: Sitting   Cuff Size: Large Adult   Weight: 126.1 kg (278 lb)       General Appearance:  She does not appear to be in any distress.  This  is a well developed, well nourished, well groomed female.        Neurological:  The patient is alert and oriented to time, place, person, and situation without any noted sensory motor deficits.    Skin:  A brief inspection of the skin revealed no rashes or lesions.     Neck:  The neck was

## 2024-04-11 NOTE — PATIENT INSTRUCTIONS
Please read the information given to you on hysteroscopy.  Remember to talk to your hematologist about the upcoming planned surgery.  Please carefully follow all the preoperative instructions given to you.  Remember not to take  Motrin, aspirin or related products regularly within 5 days of your scheduled surgery.  You may be discharged the day of surgery or the following day as a routine.  Plan on returning to the office 1-2 weeks after hospital discharge.  Please call the office if you have any questions or concerns before or after surgery.

## 2024-05-09 ENCOUNTER — ANESTHESIA EVENT (OUTPATIENT)
Dept: OPERATING ROOM | Age: 54
End: 2024-05-09
Payer: COMMERCIAL

## 2024-05-09 NOTE — H&P
[facility] [place]  [address]  [address]  [place] [address]        DATE: 2024    Updated history and Physical     David Gacria    :  1970  David Garcia is a 54 y.o. femaleGRAVIDA 5 PARA    PCP:Angelica Jimenez     David was seen 10/2023 for abnormal uterine bleeding.  FSH was elevated confirming menopausal status.  Office EMB was performed but nondiagnostic.  Previous TVS with endometrial stripe was reassuring.  Recommended a hysteroscopy with D&C however she failed to follow-up?  She presents today stating that since then she has continued to have erratic vaginal bleeding.  It may last 1-2 weeks and flow varies from scant to moderate.  Bleeding can be bright but it is painless.  She is also known to be chronically anemic and states that her last hemoglobin was done on 3/21/2024 and 7.0.  She receives iron infusion and had 1 done then.  She has follow-up scheduled on 2024.  She states that she is not experiencing any increased fatigue, SOB/CP and denies any constipation/diarrhea or blood in her stool.  Denies any UTI symptoms or hematuria.     Details     Reading Physician Reading Date Result Priority   Bradley Nolan MD  [phone] 10/23/2023 Routine      Narrative & Impression  YoelAbbeydy  10/10/2023  9:23 AM EDT  Progress Notes  US PELVIS COMPLETE NON OB TRANSABDOMINAL AND TRANSVAGINAL     UTERUS: 13.5 x 7.8 x 8.6 cm  Nabothian cysts seen w/in asha     ENDO: 0.50 cm  Mucosal fibroid seen w/in the fundal endo = 6.0 x 3.6 x 4.2 cm     RT. OVARY: 2.0 x 1.8 x 2.0 cm  unremarkable     LT. OVARY: not seen at this time     Small amount of pelvic free fluid seen           10/3/23 1047     Surgical Pathology Report Path Number: [id number]    -- Diagnosis --  A. ENDOMETRIUM, BIOPSY:  Inspissated mucus with few fragments of  inactive surface endometrium.         Reny Naqvi M.D.  **Electronically    Neurological:  The patient is alert and oriented to time, place, person, and situation without any noted sensory motor deficits.     Skin:  A brief inspection of the skin revealed no rashes or lesions.     Neck:  The neck was supple.  There is no tracheal deviation, thyromegaly or supraclavicular adenopathy appreciated.     Breast:   The patients breasts were symmetrical.  Breasts are nontender and there  were no masses, discharge or pathologic skin changes.   There is no supraclavicular or axillary adenopathy bilaterally.     Respiratory:  There was unlabored respiratory effort. Lungs clear to ascultation without wheezes, rales or rhonchi in all fields bilaterally.     Cardiovascular:  Normal sinus rhythm with a regular rate and without murmur, rubs or gallops.     Abdomen:  The abdomen was soft and non-tender with no guarding, rebound, CVAT or rigidity.  No hernias were appreciated.  Bowel sounds were normally active.     Pelvic exam:  No vulvar, vaginal or cervical lesions are noted.  Normal vaginal discharge present, no significant cystocele, rectocele or enterocele noted.  Uterus bulky, approximately 12 weeks and without CMT and adnexa nontender and without abnormal masses bilaterally.     Extremities:  FROM and nontender without clubbing cyanosis or edema.     ASSESSMENT:             ICD-10-CM     1. Submucous leiomyoma of uterus  D25.0         2. Postmenopausal bleeding  N95.0         3. Obesities, morbid (HCC)  E66.01                                                                                            PLAN:     Proper informed consent was done, alternatives were discussed   and she understands the surgical risks to the proposed procedure including but not limited to: infection, hemorrhage, blood clot formation, damage to the gastrointestinal/genital urinary/neurological/vascular systems, death and failure in the proposed procedure's intent. She also understands the risks from transfusion including

## 2024-05-10 ENCOUNTER — ANESTHESIA (OUTPATIENT)
Dept: OPERATING ROOM | Age: 54
End: 2024-05-10
Payer: COMMERCIAL

## 2024-05-10 ENCOUNTER — HOSPITAL ENCOUNTER (OUTPATIENT)
Age: 54
Setting detail: OUTPATIENT SURGERY
Discharge: HOME | End: 2024-05-10
Attending: OBSTETRICS & GYNECOLOGY | Admitting: OBSTETRICS & GYNECOLOGY
Payer: COMMERCIAL

## 2024-05-10 VITALS
TEMPERATURE: 97.5 F | HEART RATE: 57 BPM | OXYGEN SATURATION: 100 % | RESPIRATION RATE: 14 BRPM | SYSTOLIC BLOOD PRESSURE: 149 MMHG | DIASTOLIC BLOOD PRESSURE: 90 MMHG

## 2024-05-10 DIAGNOSIS — D25.9 UTERINE LEIOMYOMA, UNSPECIFIED LOCATION: ICD-10-CM

## 2024-05-10 DIAGNOSIS — D64.9 CHRONIC ANEMIA: ICD-10-CM

## 2024-05-10 DIAGNOSIS — N95.0 POST-MENOPAUSAL BLEEDING: ICD-10-CM

## 2024-05-10 PROBLEM — Z98.890 HISTORY OF HYSTEROSCOPY: Status: ACTIVE | Noted: 2024-05-10

## 2024-05-10 LAB
ABO + RH BLD: NORMAL
ANION GAP SERPL CALCULATED.3IONS-SCNC: 7 MMOL/L (ref 9–16)
ARM BAND NUMBER: NORMAL
BLOOD BANK SAMPLE EXPIRATION: NORMAL
BLOOD GROUP ANTIBODIES SERPL: NEGATIVE
BUN SERPL-MCNC: 13 MG/DL (ref 6–20)
CALCIUM SERPL-MCNC: 8.7 MG/DL (ref 8.6–10.4)
CHLORIDE SERPL-SCNC: 106 MMOL/L (ref 98–107)
CO2 SERPL-SCNC: 27 MMOL/L (ref 20–31)
CREAT SERPL-MCNC: 0.7 MG/DL (ref 0.5–0.9)
EKG ATRIAL RATE: 56 BPM
EKG P AXIS: 61 DEGREES
EKG P-R INTERVAL: 134 MS
EKG Q-T INTERVAL: 454 MS
EKG QRS DURATION: 82 MS
EKG QTC CALCULATION (BAZETT): 438 MS
EKG R AXIS: 6 DEGREES
EKG T AXIS: 12 DEGREES
EKG VENTRICULAR RATE: 56 BPM
ERYTHROCYTE [DISTWIDTH] IN BLOOD BY AUTOMATED COUNT: 24.7 % (ref 11.8–14.4)
GFR, ESTIMATED: >90 ML/MIN/1.73M2
GLUCOSE BLD-MCNC: 165 MG/DL (ref 65–105)
GLUCOSE SERPL-MCNC: 184 MG/DL (ref 74–99)
HCG SERPL QL: NEGATIVE
HCT VFR BLD AUTO: 38.1 % (ref 36.3–47.1)
HGB BLD-MCNC: 11.6 G/DL (ref 11.9–15.1)
MCH RBC QN AUTO: 26.1 PG (ref 25.2–33.5)
MCHC RBC AUTO-ENTMCNC: 30.4 G/DL (ref 28.4–34.8)
MCV RBC AUTO: 85.6 FL (ref 82.6–102.9)
NRBC BLD-RTO: 0 PER 100 WBC
PLATELET # BLD AUTO: ABNORMAL K/UL (ref 138–453)
PLATELET, FLUORESCENCE: 255 K/UL (ref 138–453)
PLATELETS.RETICULATED NFR BLD AUTO: 5.5 % (ref 1.1–10.3)
POTASSIUM SERPL-SCNC: 4.2 MMOL/L (ref 3.7–5.3)
RBC # BLD AUTO: 4.45 M/UL (ref 3.95–5.11)
SODIUM SERPL-SCNC: 140 MMOL/L (ref 136–145)
WBC OTHER # BLD: 5.4 K/UL (ref 3.5–11.3)

## 2024-05-10 PROCEDURE — 86901 BLOOD TYPING SEROLOGIC RH(D): CPT

## 2024-05-10 PROCEDURE — 85055 RETICULATED PLATELET ASSAY: CPT

## 2024-05-10 PROCEDURE — 86850 RBC ANTIBODY SCREEN: CPT

## 2024-05-10 PROCEDURE — 86900 BLOOD TYPING SEROLOGIC ABO: CPT

## 2024-05-10 PROCEDURE — 84703 CHORIONIC GONADOTROPIN ASSAY: CPT

## 2024-05-10 PROCEDURE — 2720000010 HC SURG SUPPLY STERILE: Performed by: OBSTETRICS & GYNECOLOGY

## 2024-05-10 PROCEDURE — 58558 HYSTEROSCOPY BIOPSY: CPT | Performed by: OBSTETRICS & GYNECOLOGY

## 2024-05-10 PROCEDURE — 6360000002 HC RX W HCPCS

## 2024-05-10 PROCEDURE — 88305 TISSUE EXAM BY PATHOLOGIST: CPT

## 2024-05-10 PROCEDURE — 3700000000 HC ANESTHESIA ATTENDED CARE: Performed by: OBSTETRICS & GYNECOLOGY

## 2024-05-10 PROCEDURE — 7100000000 HC PACU RECOVERY - FIRST 15 MIN: Performed by: OBSTETRICS & GYNECOLOGY

## 2024-05-10 PROCEDURE — 82947 ASSAY GLUCOSE BLOOD QUANT: CPT

## 2024-05-10 PROCEDURE — 7100000001 HC PACU RECOVERY - ADDTL 15 MIN: Performed by: OBSTETRICS & GYNECOLOGY

## 2024-05-10 PROCEDURE — 3700000001 HC ADD 15 MINUTES (ANESTHESIA): Performed by: OBSTETRICS & GYNECOLOGY

## 2024-05-10 PROCEDURE — 7100000011 HC PHASE II RECOVERY - ADDTL 15 MIN: Performed by: OBSTETRICS & GYNECOLOGY

## 2024-05-10 PROCEDURE — 2580000003 HC RX 258: Performed by: OBSTETRICS & GYNECOLOGY

## 2024-05-10 PROCEDURE — 2580000003 HC RX 258

## 2024-05-10 PROCEDURE — 85027 COMPLETE CBC AUTOMATED: CPT

## 2024-05-10 PROCEDURE — 2500000003 HC RX 250 WO HCPCS

## 2024-05-10 PROCEDURE — 93005 ELECTROCARDIOGRAM TRACING: CPT | Performed by: ANESTHESIOLOGY

## 2024-05-10 PROCEDURE — 3600000013 HC SURGERY LEVEL 3 ADDTL 15MIN: Performed by: OBSTETRICS & GYNECOLOGY

## 2024-05-10 PROCEDURE — 3600000003 HC SURGERY LEVEL 3 BASE: Performed by: OBSTETRICS & GYNECOLOGY

## 2024-05-10 PROCEDURE — 80048 BASIC METABOLIC PNL TOTAL CA: CPT

## 2024-05-10 PROCEDURE — 7100000010 HC PHASE II RECOVERY - FIRST 15 MIN: Performed by: OBSTETRICS & GYNECOLOGY

## 2024-05-10 PROCEDURE — 2580000003 HC RX 258: Performed by: ANESTHESIOLOGY

## 2024-05-10 PROCEDURE — 2709999900 HC NON-CHARGEABLE SUPPLY: Performed by: OBSTETRICS & GYNECOLOGY

## 2024-05-10 RX ORDER — HYDRALAZINE HYDROCHLORIDE 20 MG/ML
10 INJECTION INTRAMUSCULAR; INTRAVENOUS
Status: DISCONTINUED | OUTPATIENT
Start: 2024-05-10 | End: 2024-05-10 | Stop reason: HOSPADM

## 2024-05-10 RX ORDER — LABETALOL HYDROCHLORIDE 5 MG/ML
10 INJECTION, SOLUTION INTRAVENOUS
Status: DISCONTINUED | OUTPATIENT
Start: 2024-05-10 | End: 2024-05-10 | Stop reason: HOSPADM

## 2024-05-10 RX ORDER — SODIUM CHLORIDE 0.9 % (FLUSH) 0.9 %
5-40 SYRINGE (ML) INJECTION EVERY 12 HOURS SCHEDULED
Status: DISCONTINUED | OUTPATIENT
Start: 2024-05-10 | End: 2024-05-10 | Stop reason: HOSPADM

## 2024-05-10 RX ORDER — DIPHENHYDRAMINE HYDROCHLORIDE 50 MG/ML
12.5 INJECTION INTRAMUSCULAR; INTRAVENOUS
Status: DISCONTINUED | OUTPATIENT
Start: 2024-05-10 | End: 2024-05-10 | Stop reason: HOSPADM

## 2024-05-10 RX ORDER — ROCURONIUM BROMIDE 10 MG/ML
INJECTION, SOLUTION INTRAVENOUS PRN
Status: DISCONTINUED | OUTPATIENT
Start: 2024-05-10 | End: 2024-05-10 | Stop reason: SDUPTHER

## 2024-05-10 RX ORDER — SODIUM CHLORIDE 9 MG/ML
INJECTION, SOLUTION INTRAVENOUS PRN
Status: DISCONTINUED | OUTPATIENT
Start: 2024-05-10 | End: 2024-05-10 | Stop reason: HOSPADM

## 2024-05-10 RX ORDER — ONDANSETRON 4 MG/1
4 TABLET, FILM COATED ORAL 3 TIMES DAILY PRN
Qty: 15 TABLET | Refills: 0 | Status: SHIPPED | OUTPATIENT
Start: 2024-05-10

## 2024-05-10 RX ORDER — OXYCODONE HYDROCHLORIDE 5 MG/1
5 TABLET ORAL PRN
Status: DISCONTINUED | OUTPATIENT
Start: 2024-05-10 | End: 2024-05-10 | Stop reason: HOSPADM

## 2024-05-10 RX ORDER — IBUPROFEN 600 MG/1
600 TABLET ORAL EVERY 6 HOURS PRN
Qty: 40 TABLET | Refills: 0 | Status: SHIPPED | OUTPATIENT
Start: 2024-05-10 | End: 2024-05-10

## 2024-05-10 RX ORDER — FENTANYL CITRATE 50 UG/ML
25 INJECTION, SOLUTION INTRAMUSCULAR; INTRAVENOUS EVERY 5 MIN PRN
Status: DISCONTINUED | OUTPATIENT
Start: 2024-05-10 | End: 2024-05-10 | Stop reason: HOSPADM

## 2024-05-10 RX ORDER — LIDOCAINE HYDROCHLORIDE 10 MG/ML
INJECTION, SOLUTION EPIDURAL; INFILTRATION; INTRACAUDAL; PERINEURAL PRN
Status: DISCONTINUED | OUTPATIENT
Start: 2024-05-10 | End: 2024-05-10 | Stop reason: SDUPTHER

## 2024-05-10 RX ORDER — IBUPROFEN 600 MG/1
600 TABLET ORAL EVERY 6 HOURS PRN
Qty: 40 TABLET | Refills: 0 | Status: SHIPPED | OUTPATIENT
Start: 2024-05-10

## 2024-05-10 RX ORDER — ONDANSETRON 2 MG/ML
INJECTION INTRAMUSCULAR; INTRAVENOUS PRN
Status: DISCONTINUED | OUTPATIENT
Start: 2024-05-10 | End: 2024-05-10 | Stop reason: SDUPTHER

## 2024-05-10 RX ORDER — MIDAZOLAM HYDROCHLORIDE 1 MG/ML
INJECTION INTRAMUSCULAR; INTRAVENOUS PRN
Status: DISCONTINUED | OUTPATIENT
Start: 2024-05-10 | End: 2024-05-10 | Stop reason: SDUPTHER

## 2024-05-10 RX ORDER — SODIUM CHLORIDE, SODIUM LACTATE, POTASSIUM CHLORIDE, CALCIUM CHLORIDE 600; 310; 30; 20 MG/100ML; MG/100ML; MG/100ML; MG/100ML
INJECTION, SOLUTION INTRAVENOUS CONTINUOUS PRN
Status: DISCONTINUED | OUTPATIENT
Start: 2024-05-10 | End: 2024-05-10 | Stop reason: SDUPTHER

## 2024-05-10 RX ORDER — ACETAMINOPHEN 500 MG
1000 TABLET ORAL 3 TIMES DAILY
Qty: 40 TABLET | Refills: 0 | Status: SHIPPED | OUTPATIENT
Start: 2024-05-10 | End: 2024-05-10

## 2024-05-10 RX ORDER — OXYCODONE HYDROCHLORIDE 5 MG/1
10 TABLET ORAL PRN
Status: DISCONTINUED | OUTPATIENT
Start: 2024-05-10 | End: 2024-05-10 | Stop reason: HOSPADM

## 2024-05-10 RX ORDER — DEXAMETHASONE SODIUM PHOSPHATE 10 MG/ML
INJECTION INTRAMUSCULAR; INTRAVENOUS PRN
Status: DISCONTINUED | OUTPATIENT
Start: 2024-05-10 | End: 2024-05-10 | Stop reason: SDUPTHER

## 2024-05-10 RX ORDER — MAGNESIUM HYDROXIDE 1200 MG/15ML
LIQUID ORAL CONTINUOUS PRN
Status: DISCONTINUED | OUTPATIENT
Start: 2024-05-10 | End: 2024-05-10 | Stop reason: HOSPADM

## 2024-05-10 RX ORDER — PROPOFOL 10 MG/ML
INJECTION, EMULSION INTRAVENOUS PRN
Status: DISCONTINUED | OUTPATIENT
Start: 2024-05-10 | End: 2024-05-10 | Stop reason: SDUPTHER

## 2024-05-10 RX ORDER — DROPERIDOL 2.5 MG/ML
0.62 INJECTION, SOLUTION INTRAMUSCULAR; INTRAVENOUS
Status: DISCONTINUED | OUTPATIENT
Start: 2024-05-10 | End: 2024-05-10 | Stop reason: HOSPADM

## 2024-05-10 RX ORDER — ONDANSETRON 4 MG/1
4 TABLET, FILM COATED ORAL 3 TIMES DAILY PRN
Qty: 15 TABLET | Refills: 0 | Status: SHIPPED | OUTPATIENT
Start: 2024-05-10 | End: 2024-05-10

## 2024-05-10 RX ORDER — LORAZEPAM 2 MG/ML
0.5 INJECTION INTRAMUSCULAR
Status: DISCONTINUED | OUTPATIENT
Start: 2024-05-10 | End: 2024-05-10 | Stop reason: HOSPADM

## 2024-05-10 RX ORDER — FENTANYL CITRATE 50 UG/ML
INJECTION, SOLUTION INTRAMUSCULAR; INTRAVENOUS PRN
Status: DISCONTINUED | OUTPATIENT
Start: 2024-05-10 | End: 2024-05-10 | Stop reason: SDUPTHER

## 2024-05-10 RX ORDER — NALOXONE HYDROCHLORIDE 0.4 MG/ML
INJECTION, SOLUTION INTRAMUSCULAR; INTRAVENOUS; SUBCUTANEOUS PRN
Status: DISCONTINUED | OUTPATIENT
Start: 2024-05-10 | End: 2024-05-10 | Stop reason: HOSPADM

## 2024-05-10 RX ORDER — SODIUM CHLORIDE, SODIUM LACTATE, POTASSIUM CHLORIDE, CALCIUM CHLORIDE 600; 310; 30; 20 MG/100ML; MG/100ML; MG/100ML; MG/100ML
INJECTION, SOLUTION INTRAVENOUS CONTINUOUS
Status: DISCONTINUED | OUTPATIENT
Start: 2024-05-10 | End: 2024-05-10 | Stop reason: HOSPADM

## 2024-05-10 RX ORDER — SODIUM CHLORIDE 0.9 % (FLUSH) 0.9 %
5-40 SYRINGE (ML) INJECTION PRN
Status: DISCONTINUED | OUTPATIENT
Start: 2024-05-10 | End: 2024-05-10 | Stop reason: HOSPADM

## 2024-05-10 RX ORDER — ACETAMINOPHEN 500 MG
1000 TABLET ORAL 3 TIMES DAILY
Qty: 40 TABLET | Refills: 0 | Status: SHIPPED | OUTPATIENT
Start: 2024-05-10

## 2024-05-10 RX ORDER — PROCHLORPERAZINE EDISYLATE 5 MG/ML
5 INJECTION INTRAMUSCULAR; INTRAVENOUS
Status: DISCONTINUED | OUTPATIENT
Start: 2024-05-10 | End: 2024-05-10 | Stop reason: HOSPADM

## 2024-05-10 RX ADMIN — MIDAZOLAM 2 MG: 1 INJECTION INTRAMUSCULAR; INTRAVENOUS at 08:55

## 2024-05-10 RX ADMIN — FENTANYL CITRATE 100 MCG: 50 INJECTION, SOLUTION INTRAMUSCULAR; INTRAVENOUS at 09:13

## 2024-05-10 RX ADMIN — DEXAMETHASONE SODIUM PHOSPHATE 10 MG: 10 INJECTION INTRAMUSCULAR; INTRAVENOUS at 09:14

## 2024-05-10 RX ADMIN — LIDOCAINE HYDROCHLORIDE 50 MG: 10 INJECTION, SOLUTION EPIDURAL; INFILTRATION; INTRACAUDAL; PERINEURAL at 09:13

## 2024-05-10 RX ADMIN — PROPOFOL 170 MG: 10 INJECTION, EMULSION INTRAVENOUS at 09:13

## 2024-05-10 RX ADMIN — ONDANSETRON 4 MG: 2 INJECTION INTRAMUSCULAR; INTRAVENOUS at 09:29

## 2024-05-10 RX ADMIN — SODIUM CHLORIDE, POTASSIUM CHLORIDE, SODIUM LACTATE AND CALCIUM CHLORIDE: 600; 310; 30; 20 INJECTION, SOLUTION INTRAVENOUS at 07:57

## 2024-05-10 RX ADMIN — ROCURONIUM BROMIDE 50 MG: 10 INJECTION INTRAVENOUS at 09:13

## 2024-05-10 RX ADMIN — SODIUM CHLORIDE, POTASSIUM CHLORIDE, SODIUM LACTATE AND CALCIUM CHLORIDE: 600; 310; 30; 20 INJECTION, SOLUTION INTRAVENOUS at 08:51

## 2024-05-10 RX ADMIN — SUGAMMADEX 250 MG: 100 INJECTION, SOLUTION INTRAVENOUS at 09:34

## 2024-05-10 ASSESSMENT — PAIN SCALES - GENERAL: PAINLEVEL_OUTOF10: 0

## 2024-05-10 NOTE — OP NOTE
Operative Note  Department of Obstetrics and Gynecology  [facility]       Patient: David Garcia   : 1970  MRN: [id number]       Acct: [id number]   PCP: Angelica Jimenez MD  Date of Procedure: 5/10/24    Pre-operative Diagnosis: 54 y.o. female     Abnormal Uterine Bleeding  Anemia  Hypertension   Diabetes Mellitus  BMI 44    Post-operative Diagnosis:   Abnormal Uterine Bleeding  Anemia  Hypertension   Diabetes Mellitus  BMI 44    Procedure: Aveta Hysteroscopy, Dilation and Curettage    Surgeon: Bradley Nolan  Assistants: Kristel Pitt DO PGY4; Moses Cat MS3    Anesthesia: general    Indications: Patient is a 53 y/o female with FSH indicative of menopausal status and continued uterine bleeding. Office EMB was non diagnostic. Patient continues to have erratic bleeding. Patient is also chronically anemic. Plan for hysteroscopy, dilation and curettage.    Procedure Details:   The patient was seen in the pre-op room. The risks, benefits, complications, treatment options, and expected outcomes were discussed with the patient. The patient concurred with the proposed plan, giving informed consent. The patient was taken to the Operating Room and identified as David Garcia and the procedure was verified. A Time Out was held and the above information confirmed.     After administration of general anesthesia, the patient was placed in the dorsolithotomy position with Yellofin stirrups and examination under general anesthesia performed with findings as noted below. The patient was prepped and draped in the usual sterile fashion. A weighted speculum was placed into the vagina to visualize the cervix. The cervix was grasped with a single-tooth tenaculum. The uterus and the cervix were sounded and measured 10 cm. The cervix was dilated with hegar dilators to size 7. Aveta hysteroscope was inserted into the uterine cavity showing fluffy appearing endocervical and endometrial  cavity with clots. Endometrial curettage was carried out with aveta device and sent for pathology examination. All instruments were then removed. Hemostasis was visualized at the tenaculum site on the cervix.     Instrument, sponge, and needle counts were correct at the conclusion of the case.  SCDs for DVT prophylaxis remain in place for the post operative period.     Dr. Nolan was present for the entire operation.    Findings:  Anteverted uterus sounding to 10cm. Tubal ostia difficult to visualize 2/2 obscuring clots, however uterine back wall appreciated. Normal appearing endocervical canal. Fluffy appearing endometrium with clots in the cavity. Normal appearing external genitalia. A few scattered skin tags noted on external genitalia.  Estimated Blood Loss: 20 ml  Drains:  none  Total IV Fluids: 500 ml  Total fluid deficit: Unable to calculate due to large volume spillage on the floor and on the bed pad, no suspicion for excess fluid deficit or uterine perforation  Urine output: bladder drained by patient prior to procedure  Specimens:  aveta endometrial curettings  Instrument and Sponge Count: Correct  Complications: none  Condition: stable, transfer to post anesthesia recovery    Kristel Pitt DO  Ob/Gyn Resident  5/10/2024, 10:06 AM

## 2024-05-10 NOTE — ANESTHESIA PRE PROCEDURE
Department of Anesthesiology  Preprocedure Note       Name:  David Garcia   Age:  54 y.o.  :  1970                                          MRN:  [id number]         Date:  5/10/2024      Surgeon: Surgeon(s):  Bradley Nolan MD    Procedure: Procedure(s):  DILATATION AND CURETTAGE HYSTEROSCOPY, AVETA    Medications prior to admission:   Prior to Admission medications    Medication Sig Start Date End Date Taking? Authorizing Provider   Misc. Devices MISC Single point adjustable cane 21   Roberto Valdez DO   metoprolol (TOPROL XL) 50 MG XL tablet Take 1 tablet by mouth 2 times daily    ProviderJennifer MD   MetFORMIN HCl (GLUCOPHAGE PO) Take 500 mg by mouth 2 times daily     Provider, MD Jennifer       Current medications:    No current facility-administered medications for this encounter.       Allergies:  No Known Allergies    Problem List:    Patient Active Problem List   Diagnosis Code   • AMA (advanced maternal age) multigravida 35+ O09.529   • Diabetes mellitus during pregnancy, antepartum O24.919   • Other known or suspected fetal abnormality, not elsewhere classified, affecting management of mother, antepartum condition or complication O35.8XX0   • Hypertension I10   • Benign essential hypertension, antepartum O10.019   • Polyhydramnios, antepartum complication O40.9XX0   • Obesities, morbid (HCC) E66.01       Past Medical History:        Diagnosis Date   • Anemia     with first pregnancy   • Diabetes mellitus (HCC) 2007    NIDDM   • Hypertension        Past Surgical History:        Procedure Laterality Date   • DENTAL SURGERY      dentures   • DILATION AND CURETTAGE     • PAIN MANAGEMENT PROCEDURE Bilateral 10/2021    bilateral knee injection        Social History:    Social History     Tobacco Use   • Smoking status: Former   • Smokeless tobacco: Never   Substance Use Topics   • Alcohol use: No     Comment: SOCIALLY                                Counseling

## 2024-05-10 NOTE — ANESTHESIA POSTPROCEDURE EVALUATION
Department of Anesthesiology  Postprocedure Note    Patient: David Garcia  MRN: [id number]  YOB: 1970  Date of evaluation: 5/10/2024    Procedure Summary       Date: 05/10/24 Room / Location: [address]    Anesthesia Start: 0852 Anesthesia Stop: 0951    Procedure: DILATATION AND CURETTAGE HYSTEROSCOPY, AVETA Diagnosis:       Post-menopausal bleeding      Chronic anemia      Uterine leiomyoma, unspecified location      (Post-menopausal bleeding [N95.0])      (Chronic anemia [D64.9])      (Uterine leiomyoma, unspecified location [D25.9])    Surgeons: Bradley Nolan MD Responsible Provider: Malick Condon MD    Anesthesia Type: general ASA Status: 3            Anesthesia Type: No value filed.    Brenda Phase I: Brenda Score: 10    Brenda Phase II: Brenda Score: 10    Anesthesia Post Evaluation    Patient location during evaluation: PACU  Patient participation: complete - patient participated  Level of consciousness: awake and alert  Airway patency: patent  Nausea & Vomiting: no nausea and no vomiting  Cardiovascular status: blood pressure returned to baseline  Respiratory status: acceptable  Hydration status: euvolemic  Pain management: adequate    No notable events documented.

## 2024-05-12 PROBLEM — N95.0 POST-MENOPAUSAL BLEEDING: Status: ACTIVE | Noted: 2024-05-12

## 2024-05-13 LAB — SURGICAL PATHOLOGY REPORT: NORMAL

## 2024-05-30 ENCOUNTER — OFFICE VISIT (OUTPATIENT)
Dept: OBGYN CLINIC | Age: 54
End: 2024-05-30

## 2024-05-30 VITALS — BODY MASS INDEX: 44.87 KG/M2 | DIASTOLIC BLOOD PRESSURE: 80 MMHG | SYSTOLIC BLOOD PRESSURE: 124 MMHG | WEIGHT: 278 LBS

## 2024-05-30 DIAGNOSIS — Z98.890 POSTOPERATIVE STATE: ICD-10-CM

## 2024-05-30 DIAGNOSIS — Z98.890 HISTORY OF HYSTEROSCOPY: Primary | ICD-10-CM

## 2024-05-30 PROCEDURE — 99024 POSTOP FOLLOW-UP VISIT: CPT | Performed by: OBSTETRICS & GYNECOLOGY

## 2024-05-30 NOTE — PATIENT INSTRUCTIONS
You may resume all your normal daily activities.  Return to the office in 1 year for an annual checkup.  Enjoy your summer!

## 2024-05-30 NOTE — PROGRESS NOTES
[facility], [facility] - [place]  [address]  [address]  [place] [address]       Date: 5/30/2024      Chief complaint:  Chief Complaint   Patient presents with    Post-Op Check     Here for post op Had D&C 05/10/24           David Garcia returns today for postop checkup after having an unremarkable hysteroscopy and D&C on 5/10/2024 for abnormal uterine bleeding.  She denies any fever, chills, nausea/vomiting, abdominal/pelvic discomfort, vaginal discharge/bleeding or UTI symptoms.  She's having normal bowel and urinary function and ambulating with no difficulty.      Operative findings revealed Anteverted uterus sounding to 10cm. Tubal ostia difficult to visualize 2/2 obscuring clots, however uterine back wall appreciated. Normal appearing endocervical canal. Fluffy appearing endometrium with clots in the cavity. Normal appearing external genitalia. A few scattered skin tags noted on external genitalia.  .      Pathology report shows :    5/10/24 0922    Surgical Pathology Report Path Number: [id number]    -- Diagnosis --  ENDOMETRIUM, CURETTAGE:  - SLOUGHING (MENSTRUAL) ENDOMETRIUM.       Unruly Tomlinson M.D.  **Electronically Signed Out**        sls/5/13/2024     Clinical Information  Pre-Op Diagnosis:  POST-MENOPAUSAL BLEEDING; CHRONIC ANEMIA; UTERINE  LEIOMYOMA, UNSPECIFIED LOCATION  Operative Findings:  ENDOMETRIAL CURETTINGS  Operation Performed:  DILATION AND CURETTAGE HYSTEROSCOPY, AVETA      .    Physical exam     Vitals:    05/30/24 0956   BP: 124/80   Site: Right Upper Arm   Position: Sitting   Cuff Size: Medium Adult   Weight: 126.1 kg (278 lb)      General appearance: Patient appears to be in no significant distress.  Lungs are clear to auscultation in all fields bilaterally without wheezes, rales or rhonchi.  Cardiac exam with normal sinus rhythm and rate without murmurs.  The abdomen is non-tender without signs of infection.

## 2024-07-18 ENCOUNTER — ANESTHESIA EVENT (OUTPATIENT)
Dept: OPERATING ROOM | Age: 54
End: 2024-07-18
Payer: COMMERCIAL

## 2024-07-18 ENCOUNTER — ANESTHESIA (OUTPATIENT)
Dept: OPERATING ROOM | Age: 54
End: 2024-07-18
Payer: COMMERCIAL

## 2024-07-18 ENCOUNTER — HOSPITAL ENCOUNTER (OUTPATIENT)
Age: 54
Setting detail: OUTPATIENT SURGERY
Discharge: HOME | End: 2024-07-18
Attending: INTERNAL MEDICINE | Admitting: INTERNAL MEDICINE
Payer: COMMERCIAL

## 2024-07-18 VITALS
HEIGHT: 66 IN | DIASTOLIC BLOOD PRESSURE: 95 MMHG | RESPIRATION RATE: 15 BRPM | BODY MASS INDEX: 47.09 KG/M2 | HEART RATE: 66 BPM | WEIGHT: 293 LBS | OXYGEN SATURATION: 98 % | TEMPERATURE: 97.7 F | SYSTOLIC BLOOD PRESSURE: 158 MMHG

## 2024-07-18 DIAGNOSIS — R11.0 NAUSEA: ICD-10-CM

## 2024-07-18 DIAGNOSIS — R19.7 DIARRHEA, UNSPECIFIED TYPE: ICD-10-CM

## 2024-07-18 LAB
GLUCOSE BLD-MCNC: 143 MG/DL (ref 65–105)
GLUCOSE BLD-MCNC: 144 MG/DL (ref 65–105)
HCG, PREGNANCY URINE (POC): NEGATIVE

## 2024-07-18 PROCEDURE — 2500000003 HC RX 250 WO HCPCS: Performed by: NURSE ANESTHETIST, CERTIFIED REGISTERED

## 2024-07-18 PROCEDURE — 3609012400 HC EGD TRANSORAL BIOPSY SINGLE/MULTIPLE: Performed by: INTERNAL MEDICINE

## 2024-07-18 PROCEDURE — 7100000010 HC PHASE II RECOVERY - FIRST 15 MIN: Performed by: INTERNAL MEDICINE

## 2024-07-18 PROCEDURE — 3609010300 HC COLONOSCOPY W/BIOPSY SINGLE/MULTIPLE: Performed by: INTERNAL MEDICINE

## 2024-07-18 PROCEDURE — 81025 URINE PREGNANCY TEST: CPT

## 2024-07-18 PROCEDURE — 3700000000 HC ANESTHESIA ATTENDED CARE: Performed by: INTERNAL MEDICINE

## 2024-07-18 PROCEDURE — 2709999900 HC NON-CHARGEABLE SUPPLY: Performed by: INTERNAL MEDICINE

## 2024-07-18 PROCEDURE — 88305 TISSUE EXAM BY PATHOLOGIST: CPT

## 2024-07-18 PROCEDURE — 7100000011 HC PHASE II RECOVERY - ADDTL 15 MIN: Performed by: INTERNAL MEDICINE

## 2024-07-18 PROCEDURE — 6360000002 HC RX W HCPCS: Performed by: NURSE ANESTHETIST, CERTIFIED REGISTERED

## 2024-07-18 PROCEDURE — 3700000001 HC ADD 15 MINUTES (ANESTHESIA): Performed by: INTERNAL MEDICINE

## 2024-07-18 PROCEDURE — 82947 ASSAY GLUCOSE BLOOD QUANT: CPT

## 2024-07-18 RX ORDER — PROPOFOL 10 MG/ML
INJECTION, EMULSION INTRAVENOUS CONTINUOUS PRN
Status: DISCONTINUED | OUTPATIENT
Start: 2024-07-18 | End: 2024-07-18 | Stop reason: SDUPTHER

## 2024-07-18 RX ORDER — SODIUM CHLORIDE, SODIUM LACTATE, POTASSIUM CHLORIDE, CALCIUM CHLORIDE 600; 310; 30; 20 MG/100ML; MG/100ML; MG/100ML; MG/100ML
INJECTION, SOLUTION INTRAVENOUS CONTINUOUS
Status: DISCONTINUED | OUTPATIENT
Start: 2024-07-18 | End: 2024-07-18 | Stop reason: HOSPADM

## 2024-07-18 RX ORDER — LIDOCAINE HYDROCHLORIDE 20 MG/ML
INJECTION, SOLUTION EPIDURAL; INFILTRATION; INTRACAUDAL; PERINEURAL PRN
Status: DISCONTINUED | OUTPATIENT
Start: 2024-07-18 | End: 2024-07-18 | Stop reason: SDUPTHER

## 2024-07-18 RX ORDER — LIDOCAINE HYDROCHLORIDE 10 MG/ML
1 INJECTION, SOLUTION EPIDURAL; INFILTRATION; INTRACAUDAL; PERINEURAL
Status: DISCONTINUED | OUTPATIENT
Start: 2024-07-19 | End: 2024-07-18 | Stop reason: HOSPADM

## 2024-07-18 RX ADMIN — PROPOFOL 120 MCG/KG/MIN: 10 INJECTION, EMULSION INTRAVENOUS at 14:00

## 2024-07-18 RX ADMIN — LIDOCAINE HYDROCHLORIDE 60 MG: 20 INJECTION, SOLUTION EPIDURAL; INFILTRATION; INTRACAUDAL; PERINEURAL at 14:00

## 2024-07-18 ASSESSMENT — PAIN SCALES - GENERAL: PAINLEVEL_OUTOF10: 0

## 2024-07-18 ASSESSMENT — PAIN - FUNCTIONAL ASSESSMENT: PAIN_FUNCTIONAL_ASSESSMENT: FACE, LEGS, ACTIVITY, CRY, AND CONSOLABILITY (FLACC)

## 2024-07-18 NOTE — DISCHARGE INSTRUCTIONS
Upper GI Endoscopy: What to Expect at Home  Your Recovery  You may have a sore throat for a day or two after the test.  How can you care for yourself at home?  Activity  Rest as much as you need to after you go home.  You should be able to go back to your usual activities the day after the test.  Diet  Drink plenty of fluids (unless your doctor has told you not to).  Follow-up care is a key part of your treatment and safety. Be sure to make and go to all appointments, and call your doctor if you are having problems.  When should you call for help?  Call 911 anytime you think you may need emergency care. For example, call if:  You passed out (lost consciousness).  You cough up blood.  You vomit blood or what looks like coffee grounds.  You pass maroon or very bloody stools.  Call your doctor now or seek immediate medical care if:  You have trouble swallowing.  You have belly pain.  Your stools are black and tarlike or have streaks of blood.  You are sick to your stomach or cannot keep fluids down.  Watch closely for changes in your health, and be sure to contact your doctor if:  Your throat still hurts after a day or two.  You do not get better as expected.   Where can you learn more?   Go to [email or web address] and sign in to your [employer] account. Enter J454 in the Search Health Information box to learn more about “Upper GI Endoscopy: What to Expect at Home.”    .     © 2223-7164 [employer]. Care instructions adapted under license by [employer]. This care instruction is for use with your licensed healthcare professional. If you have questions about a medical condition or this instruction, always ask your healthcare professional. [employer] disclaims any warranty or liability for your use of this information.  Content Version: 9.9.173774; Last Revised: February 20, 2013                              Colonoscopy: What to Expect at Home  Your Recovery  Your  Home.”       © 2627-6763 [employer]. Care instructions adapted under license by [facility]. This care instruction is for use with your licensed healthcare professional. If you have questions about a medical condition or this instruction, always ask your healthcare professional. [employer] disclaims any warranty or liability for your use of this information.  Content Version: 9.9.725462; Last Revised: February 20, 2013

## 2024-07-18 NOTE — OP NOTE
Operative Note      PROCEDURE NOTE    PROCEDURE NOTE    DATE OF PROCEDURE: 7/18/2024    SURGEON: Kaelyn Pacheco MD  Facility : [facility].  ASSISTANT: None  Anesthesia: MAC  PREOPERATIVE DIAGNOSIS: Lower abdominal pain and diarrhea    POSTOPERATIVE DIAGNOSIS: as described below    OPERATION: Total colonoscopy and polypectomy and biopsy    ANESTHESIA: MAC    ESTIMATED BLOOD LOSS: less than 50     COMPLICATIONS: None.     SPECIMENS:  Was Obtained: Random colon biopsies and rectal polyp    HISTORY: The patient is a 54 y.o. year old female with history of above preop diagnosis.  I recommended colonoscopy with possible biopsy or polypectomy and I explained the risk, benefits, expected outcome, and alternatives to the procedure.  Risks included but are not limited to bleeding, infection, respiratory distress, hypotension, and perforation of the colon and possibility of missing a lesion.  The patient understands and is in agreement.      PROCEDURE: The patient was given MAC.  The patient's SPO2 remained above 90% throughout the procedure.     The colonoscope was inserted per rectum and advanced under direct vision to the cecum without difficulty.      Post sedation note :The patient's SPO2 remained above 90% throughout the procedure.the vital signs remained stable , and no immediate complication form the procedure noted, patient will be ready for d/c when criteria is met .        The prep was good.      Findings:  Terminal ileum: not examined    Cecum/Ascending colon:  Entire colon is completely normal so we did random biopsy of the colon to see if there is any evidence of microscopic colitis    Transverse colon: normal    Descending/Sigmoid colon: 1 isolated medium to large sized diverticulum was seen in the descending colon.    Rectum/Anus: examined in normal and retroflexed positions and was there was 1 diminutive polyp was seen in the rectum that was completely removed.    Withdrawal Time was (minutes):

## 2024-07-18 NOTE — ANESTHESIA PRE PROCEDURE
Department of Anesthesiology  Preprocedure Note       Name:  David Garcia   Age:  54 y.o.  :  1970                                          MRN:  [id number]         Date:  2024      Surgeon: Surgeon(s):  Kaelyn Pacheco MD    Procedure: Procedure(s):  COLONOSCOPY DIAGNOSTIC  ESOPHAGOGASTRODUODENOSCOPY WITH COLD FORCEP BIOPSIES    Medications prior to admission:   Prior to Admission medications    Medication Sig Start Date End Date Taking? Authorizing Provider   acetaminophen (TYLENOL) 500 MG tablet Take 2 tablets by mouth 3 times daily 5/10/24   Kristel Pitt DO   ibuprofen (ADVIL;MOTRIN) 600 MG tablet Take 1 tablet by mouth every 6 hours as needed for Pain 5/10/24   Kristel Pitt DO   ondansetron (ZOFRAN) 4 MG tablet Take 1 tablet by mouth 3 times daily as needed for Nausea or Vomiting  Patient not taking: Reported on 2024 5/10/24   Kristel Pitt DO   Misc. Devices MISC Single point adjustable cane 21   Roberto Valdez DO   metoprolol (TOPROL XL) 50 MG XL tablet Take 1 tablet by mouth 2 times daily    ProviderJennifer MD   MetFORMIN HCl (GLUCOPHAGE PO) Take 500 mg by mouth 2 times daily     Provider, MD Jennifer       Current medications:    Current Facility-Administered Medications   Medication Dose Route Frequency Provider Last Rate Last Admin   • [START ON 2024] lidocaine PF 1 % injection 1 mL  1 mL IntraDERmal Once PRN Ama Oneil MD       • lactated ringers IV soln infusion   IntraVENous Continuous Ama Oneil MD         Facility-Administered Medications Ordered in Other Encounters   Medication Dose Route Frequency Provider Last Rate Last Admin   • lidocaine PF 2 % injection   IntraVENous PRN Gunjan Louis APRN - CRNA   60 mg at 24 1400   • propofol infusion   IntraVENous Continuous PRN Gunjan Louis APRN - CRNA 95.688 mL/hr at 24 1400 120 mcg/kg/min at 24 1400       Allergies:  No Known Allergies    Problem List:    Patient

## 2024-07-18 NOTE — OP NOTE
Operative Note      PROCEDURE NOTE    DATE OF PROCEDURE: 7/18/2024     SURGEON: Kaelyn Pacheco MD  Facility: [facility]  ASSISTANT: None  Anesthesia: MAC    PREOPERATIVE DIAGNOSIS: She has history of nausea, gastroesophageal reflux symptoms and bloating sensation.    Diagnosis: There is evidence of mild nonerosive gastritis seen in the antrum and also in the body of the stomach.    POSTOPERATIVE DIAGNOSIS: As described below    OPERATION: Upper GI endoscopy with Biopsy    ANESTHESIA: MAC     ESTIMATED BLOOD LOSS: Less than 50 ml    COMPLICATIONS: None.     SPECIMENS:  Was Obtained: Gastric biopsies were done    HISTORY: The patient is a 54 y.o. year old female with history of above preop diagnosis.  I recommended esophagogastroduodenoscopy with possible biopsy and I explained the risk, benefits, expected outcome, and alternatives to the procedure.  Risks included but are not limited to bleeding, infection, respiratory distress, hypotension, and perforation of the esophagus, stomach, or duodenum.  Patient understands and is in agreement.    PROCEDURE: The patient was given MAC.  The patient's SPO2 remained above 90% throughout the procedure.The gastroscope was inserted orally and advanced under direct vision through the esophagus, through the stomach, through the pylorus, and into the descending duodenum.      Post sedation note :The patient's SPO2 remained above 90% throughout the procedure.the vital signs remained stable , and no immediate complication form the procedure noted, patient will be ready for d/c when criteria is met .      Findings:    Retropharyngeal area was grossly normal appearing    Esophagus: normal    Stomach:    Fundus: normal    Body: Mild gastritis seen    Antrum: Mild gastritis was seen biopsies were taken for H. pylori    Duodenum:     Descending: normal    Bulb: normal    The scope was removed and the patient tolerated the procedure well.     Recommendations/Plan:   F/U Biopsies  F/U  In Office in 3-4 weeks  Discussed with the family  Avoid NSAIDs    Electronically signed by Kaelyn Pacheco MD  on 7/18/2024 at 2:14 PM

## 2024-07-18 NOTE — H&P
History and Physical Service   [facility]    HISTORY AND PHYSICAL EXAMINATION            Date of Evaluation: 2024  Patient name:  David Garcia  MRN:   [id number]  YOB: 1970  PCP:    Angelica Jimenez MD    History Obtained From:     Patient, medical records    History of Present Illness:     This is David Garcia a 54 y.o. female who presents today for a COLONOSCOPY DIAGNOSTIC, ESOPHAGOGASTRODUODENOSCOPY by Kaelyn Pacheco MD for Diarrhea, unspecified type; Nausea. Patient reports bowel changes experiencing frequent diarrhea. She denies bloody tarry stools, constipation, nausea, vomiting, abdominal pain or unintentional weight loss. Patient followed bowel prep until watery clear.  Has had previous colonoscopy. No FH colon cancer or polyps. Denies fever, chills, shortness of breath, cough, congestion, wheezing, chest pain, open sores or wounds. Hx of diabetes, POC . Denies any current blood thinning medications.       Past Medical History:     Past Medical History:   Diagnosis Date    Anemia     with first pregnancy    Arthritis     Diabetes mellitus (HCC) 2007    NIDDM    Hypertension         Past Surgical History:     Past Surgical History:   Procedure Laterality Date    BACK SURGERY       SECTION      DENTAL SURGERY      dentures    DILATION AND CURETTAGE      DILATION AND CURETTAGE OF UTERUS N/A 05/10/2024    ILATATION AND CURETTAGE HYSTEROSCOPY, AVETA    DILATION AND CURETTAGE OF UTERUS N/A 05/10/2024    DILATATION AND CURETTAGE HYSTEROSCOPY, AVETA performed by Bradley Nolan MD at [facility] OR    PAIN MANAGEMENT PROCEDURE Bilateral 10/2021    bilateral knee injection         Medications Prior to Admission:     Prior to Admission medications    Medication Sig Start Date End Date Taking? Authorizing Provider   acetaminophen (TYLENOL) 500 MG tablet Take 2 tablets by mouth 3 times daily 5/10/24   Kristel Pitt,    ibuprofen

## 2024-07-18 NOTE — ANESTHESIA POSTPROCEDURE EVALUATION
Department of Anesthesiology  Postprocedure Note    Patient: David Garcia  MRN: [id number]  YOB: 1970  Date of evaluation: 7/18/2024    Procedure Summary       Date: 07/18/24 Room / Location: [address]    Anesthesia Start: 1357 Anesthesia Stop: 1439    Procedures:       COLONOSCOPY WITH BIOPSIES, POLYPECTOMY      ESOPHAGOGASTRODUODENOSCOPY WITH COLD FORCEP BIOPSIES Diagnosis:       Diarrhea, unspecified type      Nausea      (Diarrhea, unspecified type [R19.7])      (Nausea [R11.0])    Surgeons: Kaelyn Pacheco MD Responsible Provider: Nacho Langston DO    Anesthesia Type: MAC, general ASA Status: 2            Anesthesia Type: No value filed.    Brenda Phase I: Brenda Score: 10    Brenda Phase II: Brenda Score: 9    Anesthesia Post Evaluation    Patient location during evaluation: PACU  Patient participation: complete - patient participated  Level of consciousness: awake and alert  Airway patency: patent  Nausea & Vomiting: no nausea and no vomiting  Cardiovascular status: hemodynamically stable  Respiratory status: acceptable  Hydration status: stable  Pain management: adequate    No notable events documented.

## 2024-07-22 LAB — SURGICAL PATHOLOGY REPORT: NORMAL

## 2025-05-12 ENCOUNTER — TELEPHONE (OUTPATIENT)
Dept: OBGYN CLINIC | Age: 55
End: 2025-05-12

## 2025-05-12 RX ORDER — NORETHINDRONE 5 MG/1
TABLET ORAL
Qty: 32 TABLET | Refills: 0 | Status: SHIPPED | OUTPATIENT
Start: 2025-05-12 | End: 2025-05-15 | Stop reason: SDUPTHER

## 2025-05-12 NOTE — TELEPHONE ENCOUNTER
GYN pt who seen Dr. SHAW 5.30.24 for post op appt from D&C    Pt states she has been having extremely heavy periods passing blood clots about bigger then a quarter but not as big as a golf ball since April 8th. Pt states she is not currently bleeding through a pad with in an hour but was for 2 days last week.     Pt scheduled with you next Friday at 8:30am. Is there anything you would like her to d until then?   She does have fibroids    Pt advised to go to the ER if it does become increased like previously.     Head,  normocephalic,  atraumatic,  Face,  Face within normal limits,  Ears,  External ears within normal limits,  Nose/Nasopharynx,  External nose  normal appearance,  nares patent,  no nasal discharge,  Mouth and Throat,  Oral cavity appearance normal,  Breath odor normal,  Lips,  Appearance normal

## 2025-05-15 RX ORDER — NORETHINDRONE 5 MG/1
TABLET ORAL
Qty: 32 TABLET | Refills: 0 | Status: SHIPPED | OUTPATIENT
Start: 2025-05-15 | End: 2025-06-05

## 2025-05-21 NOTE — PROGRESS NOTES
[facility] [place]  [address]  [address]  [place] [address]  Dept: [phone]  Dept Fax: [phone]    25    Chief Complaint   Patient presents with    Vaginal Bleeding       David Garcia 55 y.o. has a complaint of AUB.  She had a D&C last year for that same complaint and her pathology was normal.  She says that she started bleeding  and it was heavy and clotting.  She says the bleeding just recently stopped a week ago and she had bleeding during that entire time.  She has a period generally every month.  She says that February, March were normal periods for her.  She says they tend to be normal but then will have a couple months that last longer, maybe 3-4 months a year.  She says they are very clotting.  She is not sexually active.  She is thinking she would like to try something to helps with her period.      Review of Systems    Gynecologic History  Patient's last menstrual period was 2025.  Contraception: abstinence  Last Pap: 3/13/18  Results: normal  Last Mammogram: 3/2024  Results: normal    Obstetric History  : 4  Para: 2  AB: 2    Past Medical History:   Diagnosis Date    Anemia     with first pregnancy    Arthritis     Diabetes mellitus (HCC) 2007    NIDDM    Hypertension      Past Surgical History:   Procedure Laterality Date    BACK SURGERY       SECTION      COLONOSCOPY N/A 2024    COLONOSCOPY WITH BIOPSIES, POLYPECTOMY performed by Kaelyn Pacheco MD at [facility] OR    DENTAL SURGERY      dentures    DILATION AND CURETTAGE      DILATION AND CURETTAGE OF UTERUS N/A 05/10/2024    ILATATION AND CURETTAGE HYSTEROSCOPY, AVETA    DILATION AND CURETTAGE OF UTERUS N/A 05/10/2024    DILATATION AND CURETTAGE HYSTEROSCOPY, AVETA performed by Bradley Nolan MD at [facility] OR    PAIN MANAGEMENT PROCEDURE Bilateral 10/2021    bilateral knee injection     UPPER GASTROINTESTINAL

## 2025-05-23 ENCOUNTER — OFFICE VISIT (OUTPATIENT)
Dept: OBGYN CLINIC | Age: 55
End: 2025-05-23
Payer: COMMERCIAL

## 2025-05-23 VITALS
WEIGHT: 291 LBS | BODY MASS INDEX: 46.77 KG/M2 | HEIGHT: 66 IN | HEART RATE: 84 BPM | DIASTOLIC BLOOD PRESSURE: 82 MMHG | SYSTOLIC BLOOD PRESSURE: 141 MMHG

## 2025-05-23 DIAGNOSIS — N93.9 ABNORMAL UTERINE BLEEDING (AUB): Primary | ICD-10-CM

## 2025-05-23 PROCEDURE — 3079F DIAST BP 80-89 MM HG: CPT | Performed by: OBSTETRICS & GYNECOLOGY

## 2025-05-23 PROCEDURE — 99212 OFFICE O/P EST SF 10 MIN: CPT | Performed by: OBSTETRICS & GYNECOLOGY

## 2025-05-23 PROCEDURE — 3077F SYST BP >= 140 MM HG: CPT | Performed by: OBSTETRICS & GYNECOLOGY

## 2025-05-23 RX ORDER — ACETAMINOPHEN AND CODEINE PHOSPHATE 120; 12 MG/5ML; MG/5ML
1 SOLUTION ORAL DAILY
Qty: 30 TABLET | Refills: 12 | Status: SHIPPED | OUTPATIENT
Start: 2025-05-23

## 2025-06-02 DIAGNOSIS — N93.9 ABNORMAL UTERINE BLEEDING (AUB): ICD-10-CM

## 2025-06-02 RX ORDER — ACETAMINOPHEN AND CODEINE PHOSPHATE 120; 12 MG/5ML; MG/5ML
1 SOLUTION ORAL DAILY
Qty: 84 TABLET | Refills: 12 | Status: SHIPPED | OUTPATIENT
Start: 2025-06-02

## (undated) DEVICE — DRAPE,UNDRBUT,WHT GRAD PCH,CAPPORT,20/CS: Brand: MEDLINE

## (undated) DEVICE — COVER OR TBL W40XL90IN ABSRB STD AND GRIPPY BK SAHARA

## (undated) DEVICE — STAZ ENDO KIT: Brand: MEDLINE INDUSTRIES, INC.

## (undated) DEVICE — HYSTEROSCOPE RIGID CORAL AVETA DISP

## (undated) DEVICE — GLOVE ORTHO 7   MSG9470

## (undated) DEVICE — STRAP ARMBRD W1.5XL32IN FOAM STR YET SFT W/ HK AND LOOP

## (undated) DEVICE — GARMENT,MEDLINE,DVT,INT,CALF,MED, GEN2: Brand: MEDLINE

## (undated) DEVICE — SOLUTION SCRB 4OZ 2% CHG FOR SURG SCRBBING HND WSH

## (undated) DEVICE — SHEET,DRAPE,70X100,STERILE: Brand: MEDLINE

## (undated) DEVICE — SVMMC GYN MIN PK

## (undated) DEVICE — YANKAUER,FLEXIBLE HANDLE,REGLR CAPACITY: Brand: MEDLINE INDUSTRIES, INC.

## (undated) DEVICE — BITEBLOCK ENDOSCP 60FR MAXI WHT POLYETH STURDY W/ VELC WVN

## (undated) DEVICE — SYSTEM WST MGMT W/ CAP AVETA

## (undated) DEVICE — SYSTEM WST MGMT AVETA

## (undated) DEVICE — SOLUTION IV 1000ML 0.9% SOD CHL PH 5 INJ USP VIAFLX PLAS

## (undated) DEVICE — Device

## (undated) DEVICE — STRAP,POSITIONING,KNEE/BODY,FOAM,4X60": Brand: MEDLINE

## (undated) DEVICE — FORCEPS BX L240CM JAW DIA2.4MM ORNG L CAP W/ NDL DISP RAD